# Patient Record
Sex: FEMALE | Race: WHITE | URBAN - METROPOLITAN AREA
[De-identification: names, ages, dates, MRNs, and addresses within clinical notes are randomized per-mention and may not be internally consistent; named-entity substitution may affect disease eponyms.]

---

## 2024-07-25 ENCOUNTER — OFFICE VISIT (OUTPATIENT)
Dept: OBGYN CLINIC | Facility: CLINIC | Age: 34
End: 2024-07-25
Payer: COMMERCIAL

## 2024-07-25 VITALS
DIASTOLIC BLOOD PRESSURE: 68 MMHG | HEIGHT: 64 IN | BODY MASS INDEX: 32.5 KG/M2 | WEIGHT: 190.38 LBS | SYSTOLIC BLOOD PRESSURE: 122 MMHG | HEART RATE: 96 BPM

## 2024-07-25 DIAGNOSIS — Z12.4 CERVICAL CANCER SCREENING: ICD-10-CM

## 2024-07-25 DIAGNOSIS — Z01.419 WELL WOMAN EXAM WITH ROUTINE GYNECOLOGICAL EXAM: Primary | ICD-10-CM

## 2024-07-25 PROCEDURE — 99385 PREV VISIT NEW AGE 18-39: CPT | Performed by: NURSE PRACTITIONER

## 2024-07-25 PROCEDURE — 88175 CYTOPATH C/V AUTO FLUID REDO: CPT | Performed by: NURSE PRACTITIONER

## 2024-07-25 PROCEDURE — 3074F SYST BP LT 130 MM HG: CPT | Performed by: NURSE PRACTITIONER

## 2024-07-25 PROCEDURE — 3008F BODY MASS INDEX DOCD: CPT | Performed by: NURSE PRACTITIONER

## 2024-07-25 PROCEDURE — 3078F DIAST BP <80 MM HG: CPT | Performed by: NURSE PRACTITIONER

## 2024-07-25 PROCEDURE — 87624 HPV HI-RISK TYP POOLED RSLT: CPT | Performed by: NURSE PRACTITIONER

## 2024-07-25 NOTE — PROGRESS NOTES
Here for new gynecology visit.  34 year old G 2 P 2.  Patient's last menstrual period was 2024 (approximate)..     Here for Annual Gynecologic Exam.     Menses Q 24 days. She is on oral contraceptives but planning to discontinue to do surrogacy early next month.    Last pap smear was 2022 and it was normal.  No hx of abnormal pap smears.     OB Hx:  2 .    Family gyn hx:  neg.   Family breast hx:  neg.    Screening labs some 2023  .  History reviewed. No pertinent past medical history.    History reviewed. No pertinent surgical history.    Current Outpatient Medications on File Prior to Visit   Medication Sig Dispense Refill    tretinoin 0.025 % External Cream Apply a pea sized amount to the face, nightly 45 g 0    mometasone 0.1 % External Ointment Apply to AA on finger tips and toes BID (with Occlusion) x 7 days, then decrease use to QD until symptoms resolving. 45 g 0    ISIBLOOM 0.15-30 MG-MCG Oral Tab 15 mg.      mupirocin 2 % External Ointment 0.02 Applications.      spironolactone 50 MG Oral Tab Take one tab PO 90 tablet 3    Azelaic Acid (FINACEA) 15 % External Gel Apply to face, twice daily. 50 g 3    spironolactone 100 MG Oral Tab Take 1 tablet, daily. 90 tablet 0    Cefuroxime Axetil 250 MG Oral Tab 250 mg. (Patient not taking: Reported on 2024)      cyclobenzaprine 10 MG Oral Tab 10 mg. (Patient not taking: Reported on 2024)      Doxycycline Hyclate 100 MG Oral Tab 100 mg. (Patient not taking: Reported on 2024)       No current facility-administered medications on file prior to visit.       OB History    Para Term  AB Living   2 2 2     2   SAB IAB Ectopic Multiple Live Births           2      # Outcome Date GA Lbr Kelvin/2nd Weight Sex Type Anes PTL Lv   2 Term 16 40w0d  8 lb 1 oz (3.657 kg) F Vag-Spont EPI  MATEUSZ   1 Term 14 40w0d  8 lb (3.629 kg) F Vag-Spont EPI  MATEUSZ     ROS:    General:  No wt loss, wt gain, appetite changes.    /68   Pulse 96    Ht 64\"   Wt 190 lb 6 oz (86.4 kg)   LMP 07/02/2024 (Approximate)   BMI 32.68 kg/m²     NECK:  Thyroid normal size without nodules. No adenopathy.  LUNGS:  Clear to auscultation.  COR;  Regular rate and rhythm.    BREASTS:  symmetrical in shape. No masses tenderness, secretions, or adenopathy.  ABDOMEN:  Soft and non tender.  No organomegaly.  No inguinal adenopathy.  VULVA:  No lesions or erythema.  VAGINA:  No lesions, scant old blood.  CERVIX:  No lesions or CMT.  Pap smear done with HPV.  UTERUS:  anteflexed and normal size.  ADNEXA:  No pain or masses    IMP/PLAN:    1. Well woman exam with routine gynecological exam  Regular self breast exams recommended    2. Cervical cancer screening  Thin Prep with HPV sent    See in 1 year/ prn.

## 2024-07-31 LAB
.: NORMAL
.: NORMAL
HPV E6+E7 MRNA CVX QL NAA+PROBE: NEGATIVE

## 2024-08-07 ENCOUNTER — TELEPHONE (OUTPATIENT)
Dept: OBGYN CLINIC | Facility: CLINIC | Age: 34
End: 2024-08-07

## 2024-08-07 NOTE — TELEPHONE ENCOUNTER
----- Message from Angie Paredes sent at 8/1/2024  9:18 AM CDT -----  Normal pap and HPV, routine follow up

## 2024-08-07 NOTE — TELEPHONE ENCOUNTER
Patient called back.     Contacted patient results and recommendations given.   Patient verbalized understanding.  No further questions.

## 2024-09-25 ENCOUNTER — TELEPHONE (OUTPATIENT)
Dept: OBGYN CLINIC | Facility: CLINIC | Age: 34
End: 2024-09-25

## 2024-09-25 DIAGNOSIS — N91.2 AMENORRHEA: Primary | ICD-10-CM

## 2024-09-25 NOTE — TELEPHONE ENCOUNTER
Called to follow up with patient.   Confirmed patient is serving as a surrogate, will be using estrogen and progestin through first 12 weeks per primary clinic.     Requested previous US to be faxed to our office, fax number provided via [employer]. All questions answered. Confirmed future appointment.

## 2024-09-25 NOTE — TELEPHONE ENCOUNTER
Patient calling to initiate prenatal care  LMP UNK  Patient is 7-8 weeks on 09/25 7 wks  Confirmation Ultrasound and Appointment scheduled on   Future Appointments   Date Time Provider Department Center   10/16/2024  8:00 AM [facility] [address]   10/16/2024  9:30 AM Ana Grubbs MD [facility] [address]           Any history of ectopic pregnancy? n  Any history of miscarriage? n  Any medications that you are taking on a regular basis other than prenatal vitamins? Progestin and estrogen (if not taking prenatal vitamins, encourage patient to start taking.)  Any bleeding since the first day of last LMP and your positive pregnancy test? n    Insurance BCBS    Patient is a surrogate and US was done at primary office as required by facility for surrogacy

## 2024-10-16 ENCOUNTER — PATIENT MESSAGE (OUTPATIENT)
Dept: OBGYN CLINIC | Facility: CLINIC | Age: 34
End: 2024-10-16

## 2024-10-16 ENCOUNTER — TELEPHONE (OUTPATIENT)
Dept: OBGYN CLINIC | Facility: CLINIC | Age: 34
End: 2024-10-16

## 2024-10-16 ENCOUNTER — OFFICE VISIT (OUTPATIENT)
Dept: OBGYN CLINIC | Facility: CLINIC | Age: 34
End: 2024-10-16
Payer: COMMERCIAL

## 2024-10-16 ENCOUNTER — ULTRASOUND ENCOUNTER (OUTPATIENT)
Dept: OBGYN CLINIC | Facility: CLINIC | Age: 34
End: 2024-10-16
Payer: COMMERCIAL

## 2024-10-16 VITALS
BODY MASS INDEX: 31.65 KG/M2 | WEIGHT: 185.38 LBS | HEART RATE: 94 BPM | HEIGHT: 64 IN | SYSTOLIC BLOOD PRESSURE: 112 MMHG | DIASTOLIC BLOOD PRESSURE: 82 MMHG

## 2024-10-16 DIAGNOSIS — N91.2 AMENORRHEA: ICD-10-CM

## 2024-10-16 PROBLEM — O09.819 PREGNANCY RESULTING FROM IN VITRO FERTILIZATION, ANTEPARTUM (HCC): Status: ACTIVE | Noted: 2024-10-16

## 2024-10-16 PROBLEM — Z33.3 SURROGATE PREGNANCY (HCC): Status: ACTIVE | Noted: 2024-10-16

## 2024-10-16 PROBLEM — O09.529 ANTEPARTUM MULTIGRAVIDA OF ADVANCED MATERNAL AGE (HCC): Status: ACTIVE | Noted: 2024-10-16

## 2024-10-16 PROBLEM — O99.210 OBESITY AFFECTING PREGNANCY, ANTEPARTUM (HCC): Status: ACTIVE | Noted: 2024-10-16

## 2024-10-16 PROBLEM — O30.049 DICHORIONIC DIAMNIOTIC TWIN PREGNANCY, ANTEPARTUM (HCC): Status: ACTIVE | Noted: 2024-10-16

## 2024-10-16 PROBLEM — O21.9 NAUSEA AND VOMITING IN PREGNANCY (HCC): Status: ACTIVE | Noted: 2024-10-16

## 2024-10-16 PROCEDURE — 76856 US EXAM PELVIC COMPLETE: CPT | Performed by: OBSTETRICS & GYNECOLOGY

## 2024-10-16 PROCEDURE — 3079F DIAST BP 80-89 MM HG: CPT | Performed by: OBSTETRICS & GYNECOLOGY

## 2024-10-16 PROCEDURE — 99204 OFFICE O/P NEW MOD 45 MIN: CPT | Performed by: OBSTETRICS & GYNECOLOGY

## 2024-10-16 PROCEDURE — 3008F BODY MASS INDEX DOCD: CPT | Performed by: OBSTETRICS & GYNECOLOGY

## 2024-10-16 PROCEDURE — 3074F SYST BP LT 130 MM HG: CPT | Performed by: OBSTETRICS & GYNECOLOGY

## 2024-10-16 RX ORDER — PROGESTERONE 50 MG/ML
INJECTION, SOLUTION INTRAMUSCULAR
COMMUNITY
Start: 2024-09-09

## 2024-10-16 RX ORDER — ESTRADIOL 2 MG/1
2 TABLET ORAL DAILY
COMMUNITY

## 2024-10-16 NOTE — PATIENT INSTRUCTIONS
The ADA and ACOG define patients at increased risk of type 2 diabetes based on: body mass index (BMI) >=25 kg/m2 (>=23 kg/m2 in  Americans) plus one or more of the following [2,28]:    GDM in a previous pregnancy.    Glycated hemoglobin >=5.7 percent (39 mmol/mol), impaired glucose tolerance, or impaired fasting glucose on previous testing.    First-degree relative with diabetes.    High-risk race/ethnicity (eg, , , ,  American, ).    History of cardiovascular disease.    Hypertension (>=140/90 mmHg) or on therapy for hypertension.    High-density lipoprotein cholesterol level <35 mg/dL (0.90 mmol/L) and/or a triglyceride level >250 mg/dL (2.82 mmol/L).    Polycystic ovary syndrome (PCOS).    Physical inactivity.    Other clinical condition associated with insulin resistance (eg, severe obesity, acanthosis nigricans).          Candidates    Low-dose aspirin 81 mg: Take 2 tablets by mouth daily starting at 12 weeks until 36 weeks      Selecting high risk women for prophylaxis -- The greatest benefit appears to be in women at moderate to high risk of developing the disease [10,13-20], in whom a 62 percent reduction of  preeclampsia occurred in the ASPRE trial [21]. We recommend low-dose aspirin prophylaxis for women at high risk for preeclampsia. There is no consensus on the exact criteria that confer high risk. It is reasonable to use the USPSTF high-risk criteria, which are also endorsed by the American College of Obstetricians and Gynecologists (ACOG) [22,23]. The incidence of preeclampsia is estimated to be at least 8 percent for pregnant women with any one of these high risk factors:    ?Previous pregnancy with preeclampsia, especially early onset and with an adverse outcome.    ?Type 1 or 2 diabetes mellitus.    ?Chronic hypertension.    ?Multifetal gestation.    ?Kidney disease.    ?Autoimmune disease with potential vascular complications  (antiphospholipid syndrome, systemic lupus erythematosus).      We generally follow the USPSTF criteria and recommend low-dose aspirin for preeclampsia prevention to patients with two or more of the following moderate risk factors [22]:    ?Nulliparity.    ?Obesity (body mass index >30 kg/m2).    ?Family history of preeclampsia in mother or sister.    ?Age >=35 years.    ?Sociodemographic characteristics (Black persons, lower income level [recognizing that these are not biological factors]).    ?Personal risk factors (eg, previous pregnancy with low birth weight or small for gestational age infant, previous adverse pregnancy outcome [eg, stillbirth], interval >10 years between pregnancies).           [facility]- Prenatal Testing    The following information is designed to help you understand some of the optional tests that are available to you to screen for problems in your pregnancy.  Before considering any of these tests, you will need to consider the following questions:    [1] What would you do if an abnormality were detected?  If the answer is nothing, then you may decide to decline all testing.  [2] Would you be willing to undergo testing which might increase your risk of miscarriage, such as an amniocentesis or CVS (see below)?  [3] If you have the initial testing, and it indicates that there might be a problem, and you subsequently decide not to do invasive testing such as an amniocentesis, would you worry excessively through the remainder of the pregnancy?    The following tests are available to screen for problems in your pregnancy:      [1]  First Trimester Screening (also called Nuchal Thickness, Nuchal Translucency or NT)- This is an abdominal ultrasound done between 10 and 14 weeks by a perinatology specialist (Maternal Fetal Medicine, MFM) which measures the thickness of the skin behind your baby's neck.  Increased thickness of the skin in this area has been linked to an increased risk of  genetic abnormalities like Down Syndrome.  A second visit may be required if the baby is not in the correct position.  The ultrasound results are combined with a finger stick blood test to increase the sensitivity of the test.  You will need to schedule an appointment with the Maternal Fetal Medicine office.  Address and phone number below:  Please verify insurance coverage:  CPT code: 06772 (gamboa) or 41263 (twins)  Diagnosis: AMA (advanced maternal age) - O09.529  Maternal Fetal Medicine  Dr. Rondon, Dr. Acosta, Dr. Ibarra, and Dr. Vanegas  [address] [facility] [facility] [address] [place] [id number]  Phone [phone]  Fax [phone]      [2] Cell-Free DNA testing (NIPT)- This is a blood test done any time after 10-11 weeks which screens for genetic abnormalities like Down Syndrome.  It is greater than 98% sensitive but requires an amniocentesis for confirmation if abnormal.  It can also tell you the sex of the baby.  CPT code: 61200  Diagnosis code: AMA (advanced maternal age) - O09.529  Testing options:   Peggy- preferred for [facility] patients or all patients.  Please call [phone] or go to [employer]/empower to review billing, prior authorizations or referrals  [id number]- Optional for all insurance plans except [facility].  Please call [employer] at [phone] or go to [email or web address]/ to review billing, prior authorizations, or referrals        [3]  Alpha Fetoprotein (AFP, MSAFP)- This is a simple blood test that screens for neural tube defects such as spina bifida (an abnormal opening in the spine).  It is usually performed around 16-20 weeks.  Additional testing such as a Level II ultrasound may be recommended for an abnormal test result.  Please verify insurance coverage:  CPT code: 30459 Diagnosis code: Genetic Screening- Z36.8A    ---------------------------------------------------------------------------------------------------------------    Carrier screening:     [5] Cystic  Fibrosis/SMA Carrier Screening- Cystic Fibrosis is a genetic disease which causes lung problems in the infant.  SMA (spinal muscular atrophy) is a genetic disease that affects the nerve cells of the spinal cord.  These genetic defects would need to be passed from both parents to the child.  A blood test is performed on the mother, and if her test is positive, then the father should also be tested. These tests can be done at any time in the pregnancy, usually in the first trimester with your initial OB labs.  All babies are screened for cystic fibrosis after birth.  Please verify insurance coverage:  Cystic Fibrosis CPT Code: 09703 Diagnosis code: Cystic Fibrosis screening- Z13.228  SMA CPT Code: 57492 Diagnosis code: Genetic Screening- Z36.8A or Testing for Genetic Disease Carrier- Z13.71    [6] Hemoglobinopathy carrier screening: The hemoglobinopathies are heterogeneous genetic disorders of hemoglobin (Hb) typically inherited in an autosomal recessive pattern. The clinical presentation ranges from asymptomatic in carriers to mild to severe disease in homozygotes and compound heterozygotes. At the severe end of the spectrum, hemoglobinopathies impact quality of life, require life-long care (typically with regular blood transfusions), and can shorten life expectancy. In the [place], the American College of Obstetricians and Gynecologists (ACOG) recommends carrier screening and counseling for genetic conditions, ideally before pregnancy. Firstline for hemoglobinopathy carrier screening, includes a CBC evaluating red cell indices in all pregnant individuals [17]. A hemoglobin electrophoresis (or other protein chemistry method) is performed in addition to a CBC if there is suspicion of hemoglobinopathy based on ethnicity (, Mediterranean, , Southeast , or [place] descent) or if red cell indices show a low mean MCV or MCH. Characteristics other than ethnicity that are associated with a  higher risk for an individual to be a hemoglobinopathy carrier include a history of chronic anemia or stillbirth, a relative with a hemoglobin structural variant or thalassemia, and consanguinity In 2022 ACOG updated its recommendations to offer universal hemoglobinopathy testing to people planning pregnancy or at the initial prenatal visit if no prior testing results are available for interpretation. This is based on a high frequency of a hemoglobinopathy trait, in particular S trait, in the [place] and noting that race and self-identified ethnicity are poor alternatives for genetics.  Hemoglobin electrophoresis: Send out lab to [facility].  CPT Code: 99300 or 26468 or 55764 Diagnosis code: Z13.0 Encounter screening for hematological disorder    [employer] Carrier Screening: A carrier screening panel is available that includes cystic fibrosis, spinal muscular atrophy, hemoglobinopathy and additional autosomal recessive or X-linked disorders.  A full review of the carrier screening panel was available via [employer] website. Please call [phone] or go to [employer]/empower to review billing, prior authorizations or referrals.       ---------------------------------------------------------------------------------------------------------------    [6]  Level II Ultrasound-  This is an abdominal ultrasound done at approximately 20-21 weeks by a perinatology specialist (TENA) at [facility] which looks at many of the baby's internal structures.  Abnormalities in certain structures have been associated with an increased risk of genetic abnormalities.  It also screens for NTD's.  This ultrasound would replace the Level I Ultrasound that we normally perform at our office around the same time.    [7]  Amniocentesis-  During this procedure, a needle is passed through your abdominal wall to withdrawal some amniotic fluid from around the baby.  It screens for both genetic abnormalities and NTD's and is usually  performed around 15-16 weeks.  This test has the highest accuracy for detecting genetic abnormalities, but there may be a small risk of miscarriage or other complications.  A similar procedure called Chorionic Villus Sampling (CVS) is performed by passing a catheter through the vagina to remove a small sample of tissue from the placenta (afterbirth).  CVS may have a higher risk of miscarriage and other rare complications compared to amniocentesis but can be performed earlier in pregnancy.  Amniocentesis is often recommended/offered if any of the previously mentioned tests come back abnormal.  A pamphlet is available if you would like more information about this option.  If you decide to have an amniocentesis, the other tests would be unnecessary.      The above information covers some of the basics.  Pamphlets on the Cell-Free DNA test, Quad Screen and Cystic Fibrosis test should be in your prenatal packet.  Your doctor will discuss this information in more detail, and feel free to ask additional questions.  Also, remember that all of these tests are optional, and will only be performed at your request.  Testing that needs to be done through the perinatologist's office may require 2-3 weeks lead time to schedule.            Foods to Avoid During Pregnancy  Deli Meats- You may eat deli meats from the [employer].  If you eat deli meats in the package, it may be contaminated with Listeria. Heat all deli meats in a package to 165 degrees Fahrenheit before eating, even if the label states the meat is “pre-cooked”.    Soft Cheeses and Unpasteurized Products - You may eat soft cheeses that are pasteurized.  Please avoid all soft cheeses, milk or juice that is unpasteurized.  Fish- avoid fish that contains a high level of mercury. These include but are not limited to; [place], Orange Roughy, Tile Fish, Shark, Swordfish, and Roel Mackerel. Please see chart below for good fish choices in pregnancy. Also avoid any raw, uncooked  shellfish such as oysters, clams, or sushi.    Make sure to cook all meats; including ground beef, pork, and poultry to their desired temperatures before consuming. This reduces the chance of a food borne illness.  Caffeine- limit to 200 mg or an 8oz cup daily or less.   Alcohol- no amount of alcohol is determined to be safe in pregnancy. Do not drink any alcoholic beverages while pregnant.        Medications Safe in Pregnancy  The following over-the-counter medications may be taken safely after 12 weeks gestation by any patient who is pregnant.  Please follow the instructions on the package for adult dosage.  If you experience any symptoms prior to 12 weeks, please call the office at [phone].      Colds/Congestion: Flonase, Saline Nasal Spray, Neti Pot, Plain Robitussin, Robitussin DM, Mucinex DM, Vicks 44 E, Vicks Vapor rub, Cough drops without Zinc or Sudafed.   Contact your doctor if you have a persistent fever over 100.4, shortness of breath, coughing up green mucus, or a sore throat that persists from more than 3 days    Diarrhea: Imodium, Maalox Anti-Diarrheal or Kaopectate  Contact the office if you have diarrhea for more than 3 days.    Allergies: Benadryl, Claritin or Zyrtec    Hemorrhoids: Tucks pads, Preparation H, Annusol, Sitz bath or Witch hazel  Eat a high fiber diet and drink plenty of fluids.    Yeast: Monistat 3 or 7  Call if your symptoms do not improve, or if you experience vaginal bleeding, or a watery discharge.    Constipation: Metamucil, Colace, fiber supplement, Milk of Magnesia or Dulcolax  Drink plenty of fluids, eat high-fiber foods and take the above laxatives sporadically.     Headache or Mild aches and pain: Extra Strength Tylenol     Gas: Gas X, Gelusil, Papaya Tablets, Maalox, Mylicon or Mylanta Gas    Heartburn: Tums, Mylanta, Pepcid AC or Maalox    Sore throat: Alcohol free Chloraseptic spray or Lozenges     Nausea and Vomiting: ½ tablet Unisom plus 100mg of Vitamin B6 at  bedtime (may increase B6 up to 200mg per day), Cara root tea or raspberry leaf tea    Insomnia: Tylenol PM, Vitamin B6 50mg, warm bath or milk, Unisom, Nytol or Sominex.     We have listed a few medications for common symptoms seen in pregnancy.  Please contact the office if you are unsure about any over the counter medications that are not listed above.

## 2024-10-16 NOTE — TELEPHONE ENCOUNTER
[facility] forms rec'd, [facility] [employer]. [place] forms forward to the Forms dept via email and inter office envelope. Forms to be sent to [facility] [employer] (address, fax, email on form) and pt would like a copy to be faxed to BRIAN Ta fax# [phone] and a copy uploaded to pt's My Chart.  RANI signed and $25 fee paid

## 2024-10-16 NOTE — TELEPHONE ENCOUNTER
Contacted patient.  Patient's appointment reviewed and discussed with patient.  Patient stated that she was upset when she left the appointment because she felt that her appointment was focused on risks associated with her pregnancy and not individualized to the patient herself.  The patient stated that she has had prior pregnancies before and is aware pregnancy.  However, I reminded the patient that her prior pregnancies were gamboa and were considered not high risk.  Therefore, I took the opportunity to provide further information and counseling regarding high risk pregnancy issues directly related to her current pregnancy.     I apologized to the patient that she felt unheard.  I reviewed with the patient that we discussed her concerns at the end of the visit.  However, prior to her concerns, I did provide extensive information regarding her pregnancy risks as her pregnancy is considered higher risk.  I also reminded the patient that I inquired about further questions or concerns at the end of my high risk pregnancy discussion to allow for the patient to express her individualized concern or questions. The patient did take the opportunity to discuss her symptoms of nausea, vomiting and fatigue and how it has interfered with her job.  The patient expressed how she can have severe symptoms that prevent her from being able to work.  The patient was concerned that this information was not provided in the progress note.  I discussed with the patient that it is difficult for any provider to dictate the entire visit in a progress note but I did try to summarize the patient's symptoms and concerns.  I provided information for the patient's request for FMLA.  I discussed with the patient that the person filling out the FMLA is not medical and will not make the decision of whether or not FMLA could be completed.  A FMLA request is based on the physician recommendation and I stated to the patient that she has been given  an approval for the [place] request by this physician.  However, I reminded the patient again that her employer may elect to decline the request despite providing patient's reported symptoms and concerns.    I discussed with the patient effective ways to communicate in future visits.  I admitted to the patient that we might not have been able to communicate effectively today and I apologized again to the patient.  I inquired if the patient had any further questions, concerns or issues relating to herself and of her pregnancy.  Patient declined.  She stated she appreciated the opportunity to speak again in the follow-up call. I encouraged her to write down questions or concerns that might come up and to bring a list of questions or concerns that she may have to her visits so that we can ensure that her care is individualized her preference.  At this time, the patient stated that she was satisfied with the follow-up.  All questions and concerns were addressed.    Ana Grubbs MD   [facility]

## 2024-10-16 NOTE — PROGRESS NOTES
Medical records received after confirmation of pregnancy visit.  Medical records reviewed.    OB ultrasound 9/23/2024    Twin gestation    Fetus A:  Located to the maternal left.  Yolk sac present.  Crown-rump length 0.84 cm, gestational age 6 weeks 6 days, KARRI 5/13/2025.  Fetal heart rate 121 bpm.    Fetus B:   Located to maternal right.  Yolk sac present.  Crown-rump length 0.80 cm, gestational age 6 weeks 5 days, KARRI 5/14/2025.    OB ultrasound 724    Twin gestation    Findings: There are living dichorionic and diamniotic twin pregnancy appreciated.  Gestational sac A: yolk sac present, crown-rump length 2.35 cm, gestational age 9 weeks 0 days.  Fetal heart rate 175 bpm  Gestational sac B: Yolk sac present, crown-rump length 2.38 cm, gestational age 9 weeks 1 day, fetal heart rate 170 bpm.  No subchorionic hemorrhage noted.  Ovaries unremarkable.

## 2024-10-16 NOTE — PROGRESS NOTES
[facility]  Obstetrics and Gynecology    Subjective:     Krista Collado is a 34 year old  female presents with c/o secondary amenorrhea and positive pregnancy test. The patient was recommended to return for further evaluation. The patient reports doing ok. No LMP recorded (lmp unknown). Patient is pregnant.. Possible KARRI 25. Tested embryo with parents genetic material and IVF pregnancy with 2 embryo transfer. Parents live in [place]. Patient unaware of medical conditions of parents. Reports nausea episodes. She is able to tolerate PO but decreased. Has not been to ED or urgent care. Reports working 14 hours a day. Reports she has needed to take time off from work due to severity of symptoms.     Pregnancy planned?: yes  Pregnancy desired? Yes - Surrogate pregnancy     Review of Systems:  General: no complaints per category. See HPI for additional information.   Breast: no complaints per category. See HPI for additional information.   Respiratory: no complaints per category. See HPI for additional information.   Cardiovascular: no complaints per category. See HPI for additional information.   GI: no complaints per category. See HPI for additional information.   : no complaints per category. See HPI for additional information.   Heme: no complaints per category. See HPI for additional information.     OB History:   OB History    Para Term  AB Living   3 2 2     2   SAB IAB Ectopic Multiple Live Births           2      # Outcome Date GA Lbr Kelvin/2nd Weight Sex Type Anes PTL Lv   3 Current            2 Term 16 40w0d  8 lb 1 oz (3.657 kg) F Vag-Spont EPI  MATEUSZ   1 Term 14 40w0d  8 lb (3.629 kg) F Vag-Spont EPI  MATEUSZ       Gyne History:  Menarche: 11  Period Cycle (Days): pregnant  Period Duration (Days): n/a  Period Flow: n/a  Use of Birth Control (if yes, specify type): None  Hx Prior Abnormal Pap: No  Pap Date: 24  Pap Result Notes: wnl  No LMP recorded (lmp unknown).  Patient is pregnant.      Meds:  Medications Ordered Prior to Encounter[1]    All:  Allergies[2]    PMH:  History reviewed. No pertinent past medical history.    PSH:  History reviewed. No pertinent surgical history.    Objective:     Vitals:    10/16/24 0922   BP: 112/82   Pulse: 94   Weight: 185 lb 6.4 oz (84.1 kg)   Height: 64\"         Body mass index is 31.82 kg/m².    General: AAO.NAD.   CVS exam: normal peripheral perfusion  Chest: non-labored breathing, no tachypnea   Abdominal exam: deferred   Pelvic exam: deferred      Imaging:  LMP DOC KARRI GA(KARRI)   A GA(AUA) 10w3d              B GA(AUA) 10w2d   A KARRI(AUA) 2025     B KARRI(AUA) 2025       2D Measurements AUA Value m1 m2 m3 Meth. GP GA   A CRL (Hadlock) 3.57 cm 3.63 3.50 avg. 10w3d   A YS 0.31 cm 0.31 avg.   B CRL (Hadlock) 3.41 cm 3.36 3.46 avg. 10w2d   B YS 0.21 cm 0.21 avg.     Fetal Heart Rate   Fetus: A 161 bpm   Fetus: B 147 bpm   Fetus Compare A B   LMP: DOC: GA(KARRI) KARRI   AUA 10w3d 10w2d     Comment   di di twins   2 gest sac   2 yolk sac   2 fetal pole   cardiac activity seen   both adnexa appear wnl   cvx appears wnl   no free fluid   trns abd       KARRI 25 by US - will need to verify with [facility] medical records     Reviewed and electronically signed by: Ana Grubbs MD, 10/16/24, 9:43 AM          Assessment:     Krista Collado is a 34 year old  female who presents for secondary amenorrhea and positive pregnancy test          Plan:     Patient Active Problem List    Diagnosis    Surrogate pregnancy (HCC)     IVF pregnancy with frozen embryo x 2. Parents live in [place].   [ ] medical records       Pregnancy resulting from in vitro fertilization, antepartum (HCC)     [ ] MFM      Antepartum multigravida of advanced maternal age (HCC)     Low dose ASA  [ ] MFM      Obesity affecting pregnancy, antepartum (HCC)     Low dose ASA      Dichorionic diamniotic twin pregnancy, antepartum (HCC)     [ ] MFM       Nausea and vomiting in  pregnancy (HCC)           Secondary amenorrhea  - a/w positive pregnancy test  - Ultrasound reviewed and discussed with patient, refer above  - Pt counseled on safety, diet, exercise, caffiene, tobacco, ETOH, sexual activity, ER precautions, diet, exercise, appropriate otc medication use, substance abuse   - Counseled on taking a PNV with 0.4mg of folic acid   - genetic screening testing d/w patient and family, pt declines invasive diagnostic testing and considering first versus second trimester screening   -Carrier screening, neural tube defect screening and hemoglobinopathy screening reviewed and discussed with patient; information provided and patient considering options and recommended to request desired screening at follow-up visit  - advised to follow up to establish prenatal care   - SAB precautions provided   - d/w nausea and vomiting in pregnancy including vitamin B 6 and unisom   - COVID 19 precautions provided, recommend vaccination and booster if/when applicable   -Travel precautions provided, patient advised to not travel beyond 36 weeks as long as the pregnancy remains low risk.  Advised not to travel beyond 28 weeks for high risk pregnancy. Recommend compression socks, increase water intake and movement during traveling to prevent blood clots.  Request for FMLA   -Patient requesting intermittent FMLA as she has had to take time off of work due to nausea and fatigue of pregnancy  -Patient requesting the ability to take off 1 day/week of work if needed for nausea and fatigue of pregnancy  -Patient submitted FMLA paperwork  -Discussed with patient that she may request intermittent FMLA for her nausea and fatigue of pregnancy.  However, discussed with patient that her employer might decline per request.  Discussed with patient that activation of FMLA might interfere with her postpartum FMLA but patient states that she has already discussed with her employer and intermittent FMLA during her pregnancy will  not interfere with the postpartum FMLA.  -Recommend FMLA for up to 14 weeks for nausea and fatigue of pregnancy      All of the findings and plan were discussed with the patient.  She notes understanding and agrees with the plan of care.  All questions were answered to the best of my ability at this time.      Total patient time was 50 minutes in evaluation, consultation, and coordination of care. This included face to face and non-face to face actions. The patient's questions and concerns that were addressed.     RTC in 4 weeks for NOB visit or sooner if needed     Ana Grubbs MD   [facility] - OBGYN     Discussed with patient that there will not be further notification of normal or benign results other than receiving results on Confovis. A Confovis message or telephone call will be placed by the physician and/or office staff if results are abnormal.         Note to patient and family   The 21st Century Cures Act makes medical notes available to patients in the interest of transparency.  However, please be advised that this is a medical document.  It is intended as [id number] communication.  It is written and medical language may contain abbreviations or verbiage that are technical and unfamiliar.  It may appear blunt or direct.  Medical documents are intended to carry relevant information, facts as evident, and the clinical opinion of the practitioner.      This note could include assistance by Dragon voice recognition. Errors in content may be related to improper recognition by the system; efforts to review and correct have been done but errors may still exist.              [1]   Current Outpatient Medications on File Prior to Visit   Medication Sig Dispense Refill    estradiol 2 MG Oral Tab Take 1 tablet (2 mg total) by mouth daily.      progesterone oil 50 MG/ML Intramuscular Oil INJECT 2ML INTO THE MUSCLE EVERY DAY      tretinoin 0.025 % External Cream Apply a pea sized amount to the face, nightly (Patient not  taking: Reported on 10/16/2024) 45 g 0    mometasone 0.1 % External Ointment Apply to AA on finger tips and toes BID (with Occlusion) x 7 days, then decrease use to QD until symptoms resolving. (Patient not taking: Reported on 10/16/2024) 45 g 0    Cefuroxime Axetil 250 MG Oral Tab 250 mg. (Patient not taking: Reported on 10/16/2024)      cyclobenzaprine 10 MG Oral Tab 10 mg. (Patient not taking: Reported on 10/16/2024)      ISIBLOOM 0.15-30 MG-MCG Oral Tab 15 mg. (Patient not taking: Reported on 10/16/2024)      Doxycycline Hyclate 100 MG Oral Tab 100 mg. (Patient not taking: Reported on 10/16/2024)      mupirocin 2 % External Ointment 0.02 Applications. (Patient not taking: Reported on 10/16/2024)       No current facility-administered medications on file prior to visit.   [2] No Known Allergies

## 2024-10-18 ENCOUNTER — MED REC SCAN ONLY (OUTPATIENT)
Dept: OBGYN CLINIC | Facility: CLINIC | Age: 34
End: 2024-10-18

## 2024-10-22 NOTE — TELEPHONE ENCOUNTER
Received Family Medical Leave Act form in forms department. Valid [facility] authorization attached. Logged for processing.

## 2024-10-23 ENCOUNTER — TELEPHONE (OUTPATIENT)
Dept: OBGYN CLINIC | Facility: CLINIC | Age: 34
End: 2024-10-23

## 2024-10-23 DIAGNOSIS — O30.049 DICHORIONIC DIAMNIOTIC TWIN PREGNANCY, ANTEPARTUM (HCC): ICD-10-CM

## 2024-10-23 DIAGNOSIS — Z33.3 SURROGATE PREGNANCY (HCC): Primary | ICD-10-CM

## 2024-10-23 DIAGNOSIS — O09.819 PREGNANCY RESULTING FROM IN VITRO FERTILIZATION, ANTEPARTUM (HCC): ICD-10-CM

## 2024-10-23 NOTE — TELEPHONE ENCOUNTER
Dr. Grubbs    **The ACKNOWLEDGE button has been moved to the top right ribbon**    Please sign off on form if you agree to: Family Medical Leave Act intermittent for nausea and fatigue  1 episode per 1 week lasting 1 day (per ovn)  (place your signature on the first page only)  -From your Inbasket, Highlight the patient and click Chart  -Double click the 10/16/24 Forms Completion telephone encounter  -Scroll down to the Media section  -Click the blue Hyperlink: Zane Grubbs 10/23/24    -Click Acknowledge located in the top right ribbon/menu  -Drag the mouse into the blank space of the document and a + sign will appear. Left click to  electronically sign the document.    Thank you,    Katlin

## 2024-10-23 NOTE — TELEPHONE ENCOUNTER
Left voice mail to confirm that both NIPT and NT are tests that can be ordered if desired.   Explained NT would be a referral to [facility].

## 2024-10-23 NOTE — TELEPHONE ENCOUNTER
Patient is a surrogate and was seen for first OB appt on 10/16.  The patient is asking if we draw NIPT blood work and perform a Nuchel translucent US during pregnancy.  Pleaes advise the patient.  Thank you.

## 2024-10-24 NOTE — TELEPHONE ENCOUNTER
Unfortunately, that is correct. The test can only identify two separate genetic material. Surrogate pregnancy has three separate genetic material. I apologize for the confusion but I was unaware of the test limitations regarding surrogacy. However, she can still proceed with first trimester screen with nuchal translucency with MFM.

## 2024-10-24 NOTE — TELEPHONE ENCOUNTER
Patient is ineligible for Panorama due to surrogate status of twin pregnancy.  Routed to provider for recommendation on genetic testing.

## 2024-10-25 NOTE — TELEPHONE ENCOUNTER
Explained to patient that NIPT is not an option for this pregnancy but NT test is okay.  Phone number provided for patient to call M to schedule appointment.  Patient aware that test is time sensitive and she should call now to schedule.

## 2024-10-28 ENCOUNTER — TELEPHONE (OUTPATIENT)
Dept: OBGYN CLINIC | Facility: CLINIC | Age: 34
End: 2024-10-28

## 2024-10-28 NOTE — TELEPHONE ENCOUNTER
ANDREWM called stating that the basic panel for panorama can be ordered for this patient.  Please call M with any questions.

## 2024-10-28 NOTE — PROGRESS NOTES
Outpatient Maternal-Fetal Medicine Consultation    Dear Dr. Jimenez    Thank you for requesting ultrasound evaluation and maternal fetal medicine consultation on your patient Krista Collado.  As you are aware she is a 34 year old female  with a twin pregnancy and an Estimated Date of Delivery: 25 A maternal-fetal medicine consultation was requested secondary to advanced maternal age and twin gestation .  Her prenatal records and labs were reviewed.    HISTORY  # 1 - Date: 14, Sex: Female, Weight: 8 lb (3.629 kg), GA: 40w0d, Type: Vaginal, Spontaneous, Apgar1: None, Apgar5: None, Living: Living, Birth Comments: None    # 2 - Date: 16, Sex: Female, Weight: 8 lb 1 oz (3.657 kg), GA: 40w0d, Type: Vaginal, Spontaneous, Apgar1: None, Apgar5: None, Living: Living, Birth Comments: None    # 3 - Date: None, Sex: None, Weight: None, GA: None, Type: None, Apgar1: None, Apgar5: None, Living: None, Birth Comments: None      Past Medical History  The patient  has no past medical history on file.    Past Surgical History  The patient  has no past surgical history on file.    Family History  The patient has no family status information on file.       Medications:   Current Outpatient Medications:     estradiol 2 MG Oral Tab, Take 1 tablet (2 mg total) by mouth daily., Disp: , Rfl:     progesterone oil 50 MG/ML Intramuscular Oil, INJECT 2ML INTO THE MUSCLE EVERY DAY, Disp: , Rfl:     tretinoin 0.025 % External Cream, Apply a pea sized amount to the face, nightly (Patient not taking: Reported on 10/16/2024), Disp: 45 g, Rfl: 0    mometasone 0.1 % External Ointment, Apply to AA on finger tips and toes BID (with Occlusion) x 7 days, then decrease use to QD until symptoms resolving. (Patient not taking: Reported on 10/16/2024), Disp: 45 g, Rfl: 0    Cefuroxime Axetil 250 MG Oral Tab, 250 mg. (Patient not taking: Reported on 10/16/2024), Disp: , Rfl:     cyclobenzaprine 10 MG Oral Tab, 10 mg. (Patient not taking:  Reported on 10/16/2024), Disp: , Rfl:     ISIBLOOM 0.15-30 MG-MCG Oral Tab, 15 mg. (Patient not taking: Reported on 10/16/2024), Disp: , Rfl:     Doxycycline Hyclate 100 MG Oral Tab, 100 mg. (Patient not taking: Reported on 10/16/2024), Disp: , Rfl:     mupirocin 2 % External Ointment, 0.02 Applications. (Patient not taking: Reported on 10/16/2024), Disp: , Rfl:   Allergies: Allergies[1]    PHYSICAL EXAMINATION:  LMP  (LMP Unknown)   General: alert and oriented,no acute distress  Abdomen: gravid, soft, non-tender  Extremities: non-tender, no edema    OBSTETRIC ULTRASOUND    This is Dichorionic-diamniotic intrauterine pregnancy.  Twin A:   Cardiac activity 153 bpm  Amniotic fluid is normal amount.  PLacenta is anterior  The CRL is consistent with gestational age. Nasal bone seen. The nuchal translucency measures 1.32 mm. This is within normal limits.      Twin B:  Cardiac activity is 161 bpm  Amniotic fluid is normal amount  Placenta is anterior  The CRL is consistent with gestational age. Nasal bone seen. The nuchal translucency measures 1.14 mm. This is within normal limits.      The maternal uterus and ovaries appear normal.    See PACS/Imaging Tab For Complete Ultrasound Report  I interpreted the results and reviewed them with the patient.    DISCUSSION  During her visit we discussed and reviewed the following issues:  ADVANCED MATERNAL AGE    Background  I reviewed with the patient that pregnancies in women of advanced maternal age (35 or older at delivery) are associated with elevated risks.  Specifically, there is a higher rate of:    Fetal malformations  Preeclampsia  Gestational diabetes  Intrauterine fetal death    As a result, enhanced pregnancy surveillance is advised for these patients including a comprehensive ultrasound to assess for fetal malformations  (at 20 weeks) and a third trimester ultrasound assessment for fetal growth (at 32 weeks).  In addition, weekly NST's (initiating at 36 weeks gestation  for women 35-39 years and at 32 weeks gestation for women 40 years and older) are also advised.  Routine obstetric care is more than adequate to assess for gestational diabetes and preeclampsia; hence, no further significant alterations in obstetric care are advised.    Fetal Aneuploidy    We also discussed the increased risk of chromosomal abnormalities associated with advanced maternal age.  I reviewed that an ultrasound examination cannot reliably exclude potential genetic abnormalities.     Non-invasive Pregnancy Testing (NIPT)    I reviewed current non-invasive screening options.  Currently non-invasive pregnancy testing (NIPT) offers the highest detection rate (with the lowest false positive rate) for the detection of fetal aneuploidy amongst high-risk patients.  The limitations of detailed mid-trimester sonography was reviewed with the patient.  First trimester screening and second trimester multiple-marker serum serum screening as alternative aneuploidy screening options were also reviewed.  However, both of these tests are associated with lower detection and higher false positive rates.    The patient ultimately elected to obtain NIPT (cell-free fetal DNA assessment).  We expect the results to be available within 7 days; the patient will be informed when the results are available.    DIAMNIOTIC DICHORIONIC TWIN GESTATION    Background:  Twins occur in approximately 1 in 80 in the [place].   Multiple pregnancies comprise an increasing proportion of the total pregnancies in the developed world due to older maternal age at childbirth and the expanded use of fertility treatments. In countries with high rates of multiple births, 30 to 50 percent of twin pregnancies occur after infertility treatment.        Increased  Mortality of Twin Gestations    The mortality rate of infants is higher in multiple than gamboa pregnancies because of the increased rates of prematurity, growth abnormalities,  other obstetric complications, and congenital anomalies.  The incidence of single fetal death in twin pregnancies is 2.5 to 5.0 percent. In a retrospective study of 92 pregnancies, the incidence of single fetal death was much higher in monochorionic than diamniotic twins/triplets (26 versus 2.4 percent).     Birth with  Morbidity  The most serious risk of multiple gestations is spontaneous  delivery, which is associated with increased  mortality and short-term and long-term morbidity.  50% of twins are born  with average gestational age of delivery being 36 weeks.  The complications seen most frequently are hypothermia, respiratory abnormalities, patent ductus arteriosus, intracranial hemorrhage, hypoglycemia, necrotizing enterocolitis, infection, and retinopathy of prematurity.      IUGR  Growth restriction and discordant growth are frequent complications of multiple pregnancies and contribute to  morbidity and mortality.  IUGR is a major risk factor for increased  morbidity and mortality in twin pregnancies.       Congenital Anomalies  Higher rates of congenital anomalies contribute to adverse outcomes in multiple births. Monozygotic twins are two to three times more likely to have structural defects than dizygotic twins or singletons.    Psychosocial Stress Of Twin Gestations  First-time parents rarely appreciate the intensity of care that infants require. The emotional stress and complexity of caring for gamboa newborns is magnified with multiples.  The risk of postpartum depression is increased.   Encourage assistance from additional caregivers such as other family members if available. Organizations of parents of multiples may provide helpful coping strategies.    IVF GESTATION  Conception by IVF is associated with an increased incidence of several obstetrical and  complications. Most of these are related to the high incidence of multiple  gestations. The precise reasons for this increase in adverse outcomes are not clear.    ART is associated with an up to two-fold increased risk of  birth and low birth weight in mena pregnancies. ART does not appear to be an independent risk factor for adverse neurodevelopment outcome. ART appears to be at increased risk of delivering offspring with congenital malformations compared with fertile women who conceive naturally. Heart defects have been reported as high at 6 % so fetal echocardiography is recommended in all IVF patients. Stillbirth and  mortality rates appear to be increased as much as four-fold.    Mena ART pregnancies, the relative risk of common pregnancy complications such as fetal growth restriction, preeclampsia, prematurity and  mortality are increased.    IMPRESSION:  IUP at 12w3d  Dichorionic Diamniotic Twin Gestation  AMA: obtained cell free fetal DNA  today, declined invasive testing -however oocyte from 23-year-old and patient reports low risk PGT  IVF Gestation  Gestational Carrier    RECOMMENDATIONS:  Continue care with Dr. Jimenez  Cervical length U/S at 16 weeks  Level II U/S at 20 weeks  Fetal echocardiogram at 24 weeks  Monthly growth ultrasounds in the third trimester  Weekly NST's at 32 weeks.  Monitor for signs or symptoms of  labor, preeclampsia or fetal growth disturbances.  In the absence of other maternal or fetal indications, delivery advised at 38 weeks gestation.    Thank you for allowing me to participate in the care of your patient.  Please do not hesitate to contact me if additional questions or concerns arise.      Malcom Acosta M.D.    40 minutes spent in review of records, patient consultation, documentation and coordination of care.  The relevant clinical matter(s) are summarized above.     Note to patient and family  The 21st Century Cures Act makes medical notes available to patients in the interest of transparency.   However, please be advised that this is a medical document.  It is intended as [id number]-[id number] communication.  It is written and medical language may contain abbreviations or verbiage that are technical and unfamiliar.  It may appear blunt or direct.  Medical documents are intended to carry relevant information, facts as evident, and the clinical opinion of the practitioner.         [1] No Known Allergies

## 2024-10-28 NOTE — TELEPHONE ENCOUNTER
Received additional records from [facility]. Placed in abstract bin in [facility]    Future Appointments   Date Time Provider Department Center   10/31/2024 10:00 AM  [id number]  [facility]   11/7/2024  9:00 AM Angie Paredes APN [facility] [address]

## 2024-10-31 ENCOUNTER — LAB ENCOUNTER (OUTPATIENT)
Dept: LAB | Age: 34
End: 2024-10-31
Attending: STUDENT IN AN ORGANIZED HEALTH CARE EDUCATION/TRAINING PROGRAM
Payer: COMMERCIAL

## 2024-10-31 ENCOUNTER — ULTRASOUND ENCOUNTER (OUTPATIENT)
Dept: PERINATAL CARE | Facility: HOSPITAL | Age: 34
End: 2024-10-31
Attending: OBSTETRICS & GYNECOLOGY
Payer: COMMERCIAL

## 2024-10-31 ENCOUNTER — OFFICE VISIT (OUTPATIENT)
Dept: PERINATAL CARE | Facility: HOSPITAL | Age: 34
End: 2024-10-31
Attending: STUDENT IN AN ORGANIZED HEALTH CARE EDUCATION/TRAINING PROGRAM
Payer: COMMERCIAL

## 2024-10-31 VITALS
WEIGHT: 184 LBS | BODY MASS INDEX: 31.41 KG/M2 | DIASTOLIC BLOOD PRESSURE: 87 MMHG | HEART RATE: 79 BPM | SYSTOLIC BLOOD PRESSURE: 127 MMHG | HEIGHT: 64 IN

## 2024-10-31 DIAGNOSIS — Z33.3: ICD-10-CM

## 2024-10-31 DIAGNOSIS — O09.819 PREGNANCY RESULTING FROM IN VITRO FERTILIZATION, ANTEPARTUM (HCC): ICD-10-CM

## 2024-10-31 DIAGNOSIS — O30.049 DICHORIONIC DIAMNIOTIC TWIN PREGNANCY, ANTEPARTUM (HCC): Primary | ICD-10-CM

## 2024-10-31 DIAGNOSIS — O09.521 AMA (ADVANCED MATERNAL AGE) MULTIGRAVIDA 35+, FIRST TRIMESTER (HCC): ICD-10-CM

## 2024-10-31 DIAGNOSIS — O30.049 DICHORIONIC DIAMNIOTIC TWIN PREGNANCY, ANTEPARTUM (HCC): ICD-10-CM

## 2024-10-31 DIAGNOSIS — O30.041 DICHORIONIC DIAMNIOTIC TWIN PREGNANCY IN FIRST TRIMESTER (HCC): Primary | ICD-10-CM

## 2024-10-31 DIAGNOSIS — O30.041 DICHORIONIC DIAMNIOTIC TWIN PREGNANCY IN FIRST TRIMESTER (HCC): ICD-10-CM

## 2024-10-31 DIAGNOSIS — Z33.3 SURROGATE PREGNANCY (HCC): ICD-10-CM

## 2024-10-31 PROCEDURE — 76813 OB US NUCHAL MEAS 1 GEST: CPT | Performed by: OBSTETRICS & GYNECOLOGY

## 2024-10-31 PROCEDURE — 76814 OB US NUCHAL MEAS ADD-ON: CPT

## 2024-10-31 RX ORDER — CHOLECALCIFEROL (VITAMIN D3) 25 MCG
1 TABLET,CHEWABLE ORAL DAILY
COMMUNITY

## 2024-10-31 RX ORDER — ASPIRIN 81 MG/1
81 TABLET ORAL DAILY
COMMUNITY

## 2024-11-04 LAB
GESTATIONAL AGE > OR = 9W:: YES
TEST RESULT: NEGATIVE
TRISOMY 13 (PATAU SYNDROME): NEGATIVE
TRISOMY 18 (EDWARDS SYNDROME): NEGATIVE
TRISOMY 21 (DOWN SYNDROME): NEGATIVE

## 2024-11-07 ENCOUNTER — INITIAL PRENATAL (OUTPATIENT)
Dept: OBGYN CLINIC | Facility: CLINIC | Age: 34
End: 2024-11-07
Payer: COMMERCIAL

## 2024-11-07 ENCOUNTER — ULTRASOUND ENCOUNTER (OUTPATIENT)
Dept: OBGYN CLINIC | Facility: CLINIC | Age: 34
End: 2024-11-07
Payer: COMMERCIAL

## 2024-11-07 ENCOUNTER — LAB ENCOUNTER (OUTPATIENT)
Dept: LAB | Age: 34
End: 2024-11-07
Attending: NURSE PRACTITIONER
Payer: COMMERCIAL

## 2024-11-07 VITALS
DIASTOLIC BLOOD PRESSURE: 60 MMHG | BODY MASS INDEX: 31.31 KG/M2 | SYSTOLIC BLOOD PRESSURE: 114 MMHG | HEART RATE: 77 BPM | WEIGHT: 183.38 LBS | HEIGHT: 64 IN

## 2024-11-07 DIAGNOSIS — O09.529 SUPERVISION OF HIGH-RISK PREGNANCY OF ELDERLY MULTIGRAVIDA (HCC): ICD-10-CM

## 2024-11-07 DIAGNOSIS — O09.819 PREGNANCY RESULTING FROM IN VITRO FERTILIZATION, ANTEPARTUM (HCC): ICD-10-CM

## 2024-11-07 DIAGNOSIS — O09.529 ANTEPARTUM MULTIGRAVIDA OF ADVANCED MATERNAL AGE (HCC): Primary | ICD-10-CM

## 2024-11-07 DIAGNOSIS — O09.529 ANTEPARTUM MULTIGRAVIDA OF ADVANCED MATERNAL AGE (HCC): ICD-10-CM

## 2024-11-07 DIAGNOSIS — O30.049 DICHORIONIC DIAMNIOTIC TWIN PREGNANCY, ANTEPARTUM (HCC): ICD-10-CM

## 2024-11-07 DIAGNOSIS — O30.049 DICHORIONIC DIAMNIOTIC TWIN PREGNANCY, ANTEPARTUM (HCC): Primary | ICD-10-CM

## 2024-11-07 LAB
ALBUMIN SERPL-MCNC: 4.2 G/DL (ref 3.2–4.8)
ALBUMIN/GLOB SERPL: 1.4 {RATIO} (ref 1–2)
ALP LIVER SERPL-CCNC: 84 U/L
ALT SERPL-CCNC: 16 U/L
ANION GAP SERPL CALC-SCNC: 4 MMOL/L (ref 0–18)
ANTIBODY SCREEN: NEGATIVE
AST SERPL-CCNC: 17 U/L (ref ?–34)
BASOPHILS # BLD AUTO: 0.03 X10(3) UL (ref 0–0.2)
BASOPHILS NFR BLD AUTO: 0.4 %
BILIRUB SERPL-MCNC: 0.3 MG/DL (ref 0.3–1.2)
BUN BLD-MCNC: 8 MG/DL (ref 9–23)
CALCIUM BLD-MCNC: 10.1 MG/DL (ref 8.7–10.4)
CHLORIDE SERPL-SCNC: 105 MMOL/L (ref 98–112)
CO2 SERPL-SCNC: 27 MMOL/L (ref 21–32)
CREAT BLD-MCNC: 0.7 MG/DL
CREAT UR-SCNC: 46.2 MG/DL
EGFRCR SERPLBLD CKD-EPI 2021: 115 ML/MIN/1.73M2 (ref 60–?)
EOSINOPHIL # BLD AUTO: 0.08 X10(3) UL (ref 0–0.7)
EOSINOPHIL NFR BLD AUTO: 1 %
ERYTHROCYTE [DISTWIDTH] IN BLOOD BY AUTOMATED COUNT: 13 %
FASTING STATUS PATIENT QL REPORTED: YES
GLOBULIN PLAS-MCNC: 2.9 G/DL (ref 2–3.5)
GLUCOSE BLD-MCNC: 76 MG/DL (ref 70–99)
HBV SURFACE AG SER-ACNC: <0.1 [IU]/L
HBV SURFACE AG SERPL QL IA: NONREACTIVE
HCT VFR BLD AUTO: 38.7 %
HCV AB SERPL QL IA: NONREACTIVE
HGB BLD-MCNC: 12.6 G/DL
IMM GRANULOCYTES # BLD AUTO: 0.03 X10(3) UL (ref 0–1)
IMM GRANULOCYTES NFR BLD: 0.4 %
LYMPHOCYTES # BLD AUTO: 1.33 X10(3) UL (ref 1–4)
LYMPHOCYTES NFR BLD AUTO: 16.4 %
MCH RBC QN AUTO: 28.6 PG (ref 26–34)
MCHC RBC AUTO-ENTMCNC: 32.6 G/DL (ref 31–37)
MCV RBC AUTO: 87.8 FL
MONOCYTES # BLD AUTO: 0.76 X10(3) UL (ref 0.1–1)
MONOCYTES NFR BLD AUTO: 9.4 %
NEUTROPHILS # BLD AUTO: 5.87 X10 (3) UL (ref 1.5–7.7)
NEUTROPHILS # BLD AUTO: 5.87 X10(3) UL (ref 1.5–7.7)
NEUTROPHILS NFR BLD AUTO: 72.4 %
OSMOLALITY SERPL CALC.SUM OF ELEC: 279 MOSM/KG (ref 275–295)
PLATELET # BLD AUTO: 333 10(3)UL (ref 150–450)
POTASSIUM SERPL-SCNC: 3.8 MMOL/L (ref 3.5–5.1)
PROT SERPL-MCNC: 7.1 G/DL (ref 5.7–8.2)
PROT UR-MCNC: <6 MG/DL (ref ?–14)
RBC # BLD AUTO: 4.41 X10(6)UL
RH BLOOD TYPE: NEGATIVE
RUBV IGG SER QL: POSITIVE
RUBV IGG SER-ACNC: 60 IU/ML (ref 10–?)
SODIUM SERPL-SCNC: 136 MMOL/L (ref 136–145)
T PALLIDUM AB SER QL IA: NONREACTIVE
WBC # BLD AUTO: 8.1 X10(3) UL (ref 4–11)

## 2024-11-07 PROCEDURE — 80053 COMPREHEN METABOLIC PANEL: CPT

## 2024-11-07 PROCEDURE — 86803 HEPATITIS C AB TEST: CPT

## 2024-11-07 PROCEDURE — 87340 HEPATITIS B SURFACE AG IA: CPT

## 2024-11-07 PROCEDURE — 87086 URINE CULTURE/COLONY COUNT: CPT

## 2024-11-07 PROCEDURE — 87389 HIV-1 AG W/HIV-1&-2 AB AG IA: CPT

## 2024-11-07 PROCEDURE — 86762 RUBELLA ANTIBODY: CPT

## 2024-11-07 PROCEDURE — 3074F SYST BP LT 130 MM HG: CPT | Performed by: NURSE PRACTITIONER

## 2024-11-07 PROCEDURE — 3078F DIAST BP <80 MM HG: CPT | Performed by: NURSE PRACTITIONER

## 2024-11-07 PROCEDURE — 3008F BODY MASS INDEX DOCD: CPT | Performed by: NURSE PRACTITIONER

## 2024-11-07 PROCEDURE — 86900 BLOOD TYPING SEROLOGIC ABO: CPT

## 2024-11-07 PROCEDURE — 86780 TREPONEMA PALLIDUM: CPT

## 2024-11-07 PROCEDURE — 86901 BLOOD TYPING SEROLOGIC RH(D): CPT

## 2024-11-07 PROCEDURE — 86850 RBC ANTIBODY SCREEN: CPT

## 2024-11-07 PROCEDURE — 76815 OB US LIMITED FETUS(S): CPT | Performed by: OBSTETRICS & GYNECOLOGY

## 2024-11-07 PROCEDURE — 85025 COMPLETE CBC W/AUTO DIFF WBC: CPT

## 2024-11-07 PROCEDURE — 84156 ASSAY OF PROTEIN URINE: CPT

## 2024-11-07 PROCEDURE — 82570 ASSAY OF URINE CREATININE: CPT

## 2024-11-07 PROCEDURE — 36415 COLL VENOUS BLD VENIPUNCTURE: CPT

## 2024-11-07 NOTE — PROGRESS NOTES
Here for initial prenatal visit with our group.  34 year old  at 13w2d by implantation.    Patient's last menstrual period was 2024 (approximate).    Last pap smear was 2024 and it was normal.    This pregnancy is complicated by twin gestation Di/Di, advanced maternal age (the egg retrieval however was from a 23 year old,) and IVF. She is a gestational carrier.'  She has no first degree relatives with Diabetes, she has no history of Gestational Diabetes.     OB History    Para Term  AB Living   3 2 2     2   SAB IAB Ectopic Multiple Live Births           2      # Outcome Date GA Lbr Kelvin/2nd Weight Sex Type Anes PTL Lv   3 Current            2 Term 16 40w0d  8 lb 1 oz (3.657 kg) F Vag-Spont EPI  MATEUSZ   1 Term 14 40w0d  8 lb (3.629 kg) F Vag-Spont EPI  MATEUSZ       ROS:  Reviewed, all wnl    Please see prenatal physical exam tab for initial OB physical exam  US: please see US tab  She does note GERD, vomits about every 2 weeks    Prenatal course and care discussed with patient: visits, labs, HIV, AFP, sonograms, GL, GBS, restrictions.  Pamphlets given. Genetic counseling done at prior visit regarding Cystic fibrosis and SMA carrier screen, First trimester screen/NT screen, CVS, QS + midtrimester sonogram for markers, amniocentesis. Pt had the NIPT down with MFM. They have done first trimester ultrasound and will see her for a cervical length at 16 weeks.    A/P:  1. Antepartum multigravida of advanced maternal age (HCC)  She has upcoming visits with MFM  CMP  Continue BASA  Baseline urine for protein/ Creatine random    2. Dichorionic diamniotic twin pregnancy, antepartum (HCC)  Per #1  Cervical length U/S at 16 weeks  Level II U/S at 20 weeks  Fetal echocardiogram at 24 weeks  Monthly growth ultrasounds in the third trimester  Weekly NST's at 32 weeks.  Monitor for signs or symptoms of  labor, preeclampsia or fetal growth disturbances.  In the absence of other maternal or  fetal indications, delivery advised at 38 weeks gestation.    3. Pregnancy resulting from in vitro fertilization, antepartum (HCC)  Per #1    4. Supervision of high-risk pregnancy of elderly multigravida (HCC)  - Type and screen; Future  - CBC W Differential W Platelet; Future  - Hepatitis B Surface Antigen; Future  - T Pallidum Screening [place]; Future  - Rubella, IGG; Future  - HIV AG AB COMBO; Future  - Urine Culture, Routine; Future  - HCV Antibody; Future    Discussed PPI, Lansoprazole for GERD    SHILPA 4 weeks/ prn

## 2024-11-11 PROBLEM — Z67.91 RH NEGATIVE STATE IN ANTEPARTUM PERIOD (HCC): Status: ACTIVE | Noted: 2024-11-11

## 2024-11-11 PROBLEM — O26.899 RH NEGATIVE STATE IN ANTEPARTUM PERIOD (HCC): Status: ACTIVE | Noted: 2024-11-11

## 2024-12-04 ENCOUNTER — ULTRASOUND ENCOUNTER (OUTPATIENT)
Dept: PERINATAL CARE | Facility: HOSPITAL | Age: 34
End: 2024-12-04
Attending: OBSTETRICS & GYNECOLOGY
Payer: COMMERCIAL

## 2024-12-04 VITALS
HEIGHT: 64 IN | SYSTOLIC BLOOD PRESSURE: 121 MMHG | HEART RATE: 88 BPM | DIASTOLIC BLOOD PRESSURE: 82 MMHG | BODY MASS INDEX: 32.27 KG/M2 | WEIGHT: 189 LBS

## 2024-12-04 DIAGNOSIS — O09.522 MULTIGRAVIDA OF ADVANCED MATERNAL AGE IN SECOND TRIMESTER (HCC): ICD-10-CM

## 2024-12-04 DIAGNOSIS — Z33.3 SURROGATE PREGNANCY (HCC): ICD-10-CM

## 2024-12-04 DIAGNOSIS — Z03.75 SUSPECTED SHORTENING OF CERVIX NOT FOUND: ICD-10-CM

## 2024-12-04 DIAGNOSIS — O09.812 PREGNANCY RESULTING FROM IN VITRO FERTILIZATION IN SECOND TRIMESTER (HCC): ICD-10-CM

## 2024-12-04 DIAGNOSIS — O09.529 AMA (ADVANCED MATERNAL AGE) MULTIGRAVIDA 35+ (HCC): ICD-10-CM

## 2024-12-04 DIAGNOSIS — O09.819 PREGNANCY RESULTING FROM IN VITRO FERTILIZATION, ANTEPARTUM (HCC): ICD-10-CM

## 2024-12-04 DIAGNOSIS — O30.049 DICHORIONIC DIAMNIOTIC TWIN PREGNANCY, ANTEPARTUM (HCC): ICD-10-CM

## 2024-12-04 DIAGNOSIS — O30.042 DICHORIONIC DIAMNIOTIC TWIN PREGNANCY IN SECOND TRIMESTER (HCC): Primary | ICD-10-CM

## 2024-12-04 PROCEDURE — 76817 TRANSVAGINAL US OBSTETRIC: CPT | Performed by: OBSTETRICS & GYNECOLOGY

## 2024-12-04 PROCEDURE — 76815 OB US LIMITED FETUS(S): CPT

## 2024-12-04 NOTE — PROGRESS NOTES
Outpatient Maternal-Fetal Medicine Consultation    Dear Dr. Jimenez    Thank you for requesting ultrasound evaluation and maternal fetal medicine consultation on your patient Krista Collado.  As you are aware she is a 34 year old female  with a twin pregnancy and an Estimated Date of Delivery: 25 A maternal-fetal medicine  f/u is today. Her prenatal records and labs were reviewed.    Feeling well.  No concerns at this time.    HISTORY  # 1 - Date: 14, Sex: Female, Weight: 8 lb (3.629 kg), GA: 40w0d, Type: Vaginal, Spontaneous, Apgar1: None, Apgar5: None, Living: Living, Birth Comments: None    # 2 - Date: 16, Sex: Female, Weight: 8 lb 1 oz (3.657 kg), GA: 40w0d, Type: Vaginal, Spontaneous, Apgar1: None, Apgar5: None, Living: Living, Birth Comments: None    # 3 - Date: None, Sex: None, Weight: None, GA: None, Type: None, Apgar1: None, Apgar5: None, Living: None, Birth Comments: None      Past Medical History  The patient  has no past medical history on file.    Past Surgical History  The patient  has no past surgical history on file.    Family History  The patient has no family status information on file.       Medications:   Current Outpatient Medications:     prenatal vitamin with DHA 27-0.8-228 MG Oral Cap, Take 1 capsule by mouth daily., Disp: , Rfl:     aspirin 81 MG Oral Tab EC, Take 1 tablet (81 mg total) by mouth daily., Disp: , Rfl:   Allergies: Allergies[1]    PHYSICAL EXAMINATION:  /82 (BP Location: Right arm, Patient Position: Sitting, Cuff Size: adult)   Pulse 88   Ht 5' 4\" (1.626 m)   Wt 189 lb (85.7 kg)   LMP 2024 (Approximate)   BMI 32.44 kg/m²   General: alert and oriented,no acute distress  Abdomen: gravid, soft, non-tender      OBSTETRIC ULTRASOUND  Twin A - IUP in breech presentation.    Placenta is anterior.   Cardiac activity is present, 137 bpm  MVP is 3.2 cm.     Twin B - IUP in breech right presentation.   Placenta is anterior.  Cardiac activity is  present, 147 bpm  MVP is 3.2 cm.     The cervix was not able to be clearly visualized and appeared short by transabdominal ultrasound, therefore transvaginal ultrasound was performed. Transvaginal US findings: Cervix is closed, long and no funneling seen measuring 34.4 mm   See PACS/Imaging Tab For Complete Ultrasound Report  I interpreted the results and reviewed them with the patient.    DISCUSSION  During her visit we discussed and reviewed the following issues:  ADVANCED MATERNAL AGE    Background  I reviewed with the patient that pregnancies in women of advanced maternal age (35 or older at delivery) are associated with elevated risks.  Specifically, there is a higher rate of:    Fetal malformations  Preeclampsia  Gestational diabetes  Intrauterine fetal death   Please see previous MFM detailed discussion.     The patient ultimately elected to obtain NIPT (cell-free fetal DNA assessment).  We expect the results to be available within 7 days; the patient will be informed when the results are available.    DIAMNIOTIC DICHORIONIC TWIN GESTATION     Please see previous M detailed discussion.     IVF GESTATION  Conception by IVF Please see previous [facility] detailed discussion.     IMPRESSION:  IUP at 17w1d   Dichorionic Diamniotic Twin Gestation  AMA: low-risk cell free fetal DNA, declined invasive testing male female pair -however oocyte from 23-year-old and patient reports low risk PGT  IVF Gestation  Gestational Carrier  Short cervix not found.    RECOMMENDATIONS:  Continue care with Dr. Jimenez  Level II U/S at 20 weeks  Fetal echocardiogram at 24 weeks  Monthly growth ultrasounds in the third trimester  Weekly NST's at 32 weeks.  Monitor for signs or symptoms of  labor, preeclampsia or fetal growth disturbances.  In the absence of other maternal or fetal indications, delivery advised at 38 weeks gestation.    Thank you for allowing me to participate in the care of your patient.  Please do not hesitate  to contact me if additional questions or concerns arise.      Jodi Ibarra MD     20 minutes spent in review of records, patient consultation, documentation and coordination of care.  The relevant clinical matter(s) are summarized above.     Note to patient and family  The 21st Century Cures Act makes medical notes available to patients in the interest of transparency.  However, please be advised that this is a medical document.  It is intended as nxgi-jk-cuvf communication.  It is written and medical language may contain abbreviations or verbiage that are technical and unfamiliar.  It may appear blunt or direct.  Medical documents are intended to carry relevant information, facts as evident, and the clinical opinion of the practitioner.         [1] No Known Allergies

## 2024-12-04 NOTE — PROGRESS NOTES
Pt here for Cervical Length/Di Di Twin  Flutters x 1 occurrence felt per patient  Pt denies complaints.

## 2024-12-05 ENCOUNTER — ROUTINE PRENATAL (OUTPATIENT)
Dept: OBGYN CLINIC | Facility: CLINIC | Age: 34
End: 2024-12-05
Payer: COMMERCIAL

## 2024-12-05 ENCOUNTER — LAB ENCOUNTER (OUTPATIENT)
Dept: LAB | Age: 34
End: 2024-12-05
Attending: NURSE PRACTITIONER
Payer: COMMERCIAL

## 2024-12-05 VITALS
SYSTOLIC BLOOD PRESSURE: 118 MMHG | WEIGHT: 188.81 LBS | HEART RATE: 107 BPM | DIASTOLIC BLOOD PRESSURE: 72 MMHG | BODY MASS INDEX: 32 KG/M2

## 2024-12-05 DIAGNOSIS — O30.049 DICHORIONIC DIAMNIOTIC TWIN PREGNANCY, ANTEPARTUM (HCC): ICD-10-CM

## 2024-12-05 DIAGNOSIS — Z36.8A ENCOUNTER FOR ANTENATAL SCREENING FOR OTHER GENETIC DEFECT (HCC): Primary | ICD-10-CM

## 2024-12-05 DIAGNOSIS — Z36.8A ENCOUNTER FOR ANTENATAL SCREENING FOR OTHER GENETIC DEFECT (HCC): ICD-10-CM

## 2024-12-05 DIAGNOSIS — O09.819 PREGNANCY RESULTING FROM IN VITRO FERTILIZATION, ANTEPARTUM (HCC): ICD-10-CM

## 2024-12-05 PROCEDURE — 3078F DIAST BP <80 MM HG: CPT | Performed by: NURSE PRACTITIONER

## 2024-12-05 PROCEDURE — 36415 COLL VENOUS BLD VENIPUNCTURE: CPT

## 2024-12-05 PROCEDURE — 82105 ALPHA-FETOPROTEIN SERUM: CPT

## 2024-12-05 PROCEDURE — 3074F SYST BP LT 130 MM HG: CPT | Performed by: NURSE PRACTITIONER

## 2024-12-05 NOTE — PROGRESS NOTES
SHILPA  FM- possible x 1  /147  Doing well, no concerns or questions  No complaints. No LOF/VB/uctx  Limited Us with MFM yesterday with normal  Genetic testing- AFP ordeed  Anatomy Scan with MFM at 20 weeks  SHILPA 4 weeks

## 2024-12-07 LAB
AFP MOM: 2.86
AFP VALUE: 97.1 NG/ML
GA ON COLL DATE: 17.3 WEEKS
INSULIN DEP AFP: NO
MAT AGE AT EDD: 37 YR
OSBR RISK 1 IN AFP: 440
WEIGHT AFP: 188 LBS

## 2024-12-18 NOTE — PROGRESS NOTES
Outpatient Maternal-Fetal Medicine Follow-Up    Dear Dr. Jimenez    Thank you for requesting ultrasound evaluation and maternal fetal medicine consultation on your patient Krista Collado.  As you are aware she is a 34 year old female  with a twin pregnancy and an Estimated Date of Delivery: 25.  She returned to maternal-fetal medicine today for a follow-up visit.  Her history was reviewed from her prior visit and there were no interval changes.    Antepartum Risk Factors  Dichorionic Diamniotic Twin Gestation  AMA:low-risk free fetal DNA  , declined invasive testing -however oocyte from 23-year-old and patient reports low risk PGT  IVF Gestation  Gestational Carrier    PHYSICAL EXAMINATION:  /84 (BP Location: Right arm, Patient Position: Sitting, Cuff Size: adult)   Pulse 114   Ht 5' 4\" (1.626 m)   Wt 191 lb (86.6 kg)   LMP 2024 (Approximate)   BMI 32.79 kg/m²   General: alert and oriented, no acute distress  Abdomen: gravid, soft, non-tender  Extremities: non-tender, no edema    OBSTETRIC ULTRASOUND    Ultrasound findings:     The cervix was not able to be clearly visualized and appeared short by transabdominal ultrasound, therefore transvaginal ultrasound was performed. Transvaginal US findings: Cervix is closed, long and no funneling seen measuring 46.4 mm     Twin A: The fetal measurements are consistent with the established EDC. No ultrasound evidence of structural abnormalities are seen today. The nasal bone is present. No ultrasound evidence of markers for aneuploidy are seen. She understands that ultrasound exam cannot exclude genetic abnormalities and that genetic testing is recommended. The limitations of ultrasound were discussed.    Twin A - IUP in breech presentation.    Placenta is anterior.   Cardiac activity is present, 134 bpm   g ( 0 lb 12 oz)   MVP is 4.9 cm.     Twin B - IUP in breech presentation.   Placenta is anterior.   Cardiac activity is present, 151  bpm   g (0 lb 11 oz)  MVP is 4.2 cm.     Discordance - 9.1 %    Twin B:  The fetal measurements are consistent with the established EDC. No ultrasound evidence of structural abnormalities are seen today. The nasal bone is present. No ultrasound evidence of markers for aneuploidy are seen. She understands that ultrasound exam cannot exclude genetic abnormalities and that genetic testing is recommended. The limitations of ultrasound were discussed.      See PACS/Imaging Tab For Complete Ultrasound Report  I interpreted the results and reviewed them with the patient.    DISCUSSION  During her visit we discussed and reviewed the following issues:  ADVANCED MATERNAL AGE  See prior MFM notes for a detailed review.  She did not desire invasive genetic testing.   She has already obtained a low-risk NPIT result and was appropriately reassured.     IVF GESTATION  See prior MFM notes for a detailed review.    DICHORIONIC DIAMNIOTIC TWIN GESTATION - follow-up  Appropriate interval growth is noted.  The patient denies any signs or symptoms of  labor. There is no clinical evidence of preeclampsia.  See follow-up recommendations below.    IMPRESSION:  IUP at 20w2d  Dichorionic Diamniotic Twin Gestation  AMA:low-risk free fetal DNA  , declined invasive testing -however oocyte from 23-year-old and patient reports low risk PGT  IVF Gestation  Gestational Carrier    RECOMMENDATIONS:  Continue care with Dr. Jimenez  Fetal echocardiogram at 24 weeks  Monthly growth ultrasounds in the third trimester  Weekly NST's at 32 weeks.  Monitor for signs or symptoms of  labor, preeclampsia or fetal growth disturbances.  In the absence of other maternal or fetal indications, delivery advised at 38 weeks gestation.    Thank you for allowing me to participate in the care of your patient.  Please do not hesitate to contact me if additional questions or concerns arise.      Malcom Acosta M.D.    40 minutes spent in review of  records, patient consultation, documentation and coordination of care.  The relevant clinical matter(s) are summarized above.     Note to patient and family  The 21st Century Cures Act makes medical notes available to patients in the interest of transparency.  However, please be advised that this is a medical document.  It is intended as dkmn-qc-mefh communication.  It is written and medical language may contain abbreviations or verbiage that are technical and unfamiliar.  It may appear blunt or direct.  Medical documents are intended to carry relevant information, facts as evident, and the clinical opinion of the practitioner.

## 2024-12-26 ENCOUNTER — OFFICE VISIT (OUTPATIENT)
Dept: PERINATAL CARE | Facility: HOSPITAL | Age: 34
End: 2024-12-26
Attending: OBSTETRICS & GYNECOLOGY
Payer: COMMERCIAL

## 2024-12-26 ENCOUNTER — ROUTINE PRENATAL (OUTPATIENT)
Dept: OBGYN CLINIC | Facility: CLINIC | Age: 34
End: 2024-12-26
Payer: COMMERCIAL

## 2024-12-26 VITALS
BODY MASS INDEX: 32.61 KG/M2 | HEART RATE: 114 BPM | HEIGHT: 64 IN | WEIGHT: 191 LBS | SYSTOLIC BLOOD PRESSURE: 129 MMHG | DIASTOLIC BLOOD PRESSURE: 84 MMHG

## 2024-12-26 VITALS
HEART RATE: 98 BPM | BODY MASS INDEX: 33 KG/M2 | WEIGHT: 192 LBS | SYSTOLIC BLOOD PRESSURE: 124 MMHG | DIASTOLIC BLOOD PRESSURE: 80 MMHG

## 2024-12-26 DIAGNOSIS — Z33.3 SURROGATE PREGNANCY (HCC): ICD-10-CM

## 2024-12-26 DIAGNOSIS — O30.049 DICHORIONIC DIAMNIOTIC TWIN PREGNANCY, ANTEPARTUM (HCC): ICD-10-CM

## 2024-12-26 DIAGNOSIS — O09.812 PREGNANCY RESULTING FROM ASSISTED REPRODUCTIVE TECHNOLOGY, SECOND TRIMESTER (HCC): ICD-10-CM

## 2024-12-26 DIAGNOSIS — Z34.92 SECOND TRIMESTER PREGNANCY (HCC): Primary | ICD-10-CM

## 2024-12-26 DIAGNOSIS — O30.049 DICHORIONIC DIAMNIOTIC TWIN PREGNANCY, ANTEPARTUM (HCC): Primary | ICD-10-CM

## 2024-12-26 DIAGNOSIS — O09.522 AMA (ADVANCED MATERNAL AGE) MULTIGRAVIDA 35+, SECOND TRIMESTER (HCC): ICD-10-CM

## 2024-12-26 DIAGNOSIS — O30.042 DICHORIONIC DIAMNIOTIC TWIN PREGNANCY IN SECOND TRIMESTER (HCC): ICD-10-CM

## 2024-12-26 PROCEDURE — 76811 OB US DETAILED SNGL FETUS: CPT | Performed by: OBSTETRICS & GYNECOLOGY

## 2024-12-26 PROCEDURE — 3074F SYST BP LT 130 MM HG: CPT | Performed by: OBSTETRICS & GYNECOLOGY

## 2024-12-26 PROCEDURE — 76812 OB US DETAILED ADDL FETUS: CPT

## 2024-12-26 PROCEDURE — 76817 TRANSVAGINAL US OBSTETRIC: CPT

## 2024-12-26 PROCEDURE — 3079F DIAST BP 80-89 MM HG: CPT | Performed by: OBSTETRICS & GYNECOLOGY

## 2024-12-26 NOTE — PROGRESS NOTES
Pt here for Level II Ultrasound/Cervical Length  +flutters noted per patient  Pt denies complaints.

## 2024-12-26 NOTE — PROGRESS NOTES
SHILPA  34 year old yo, , at GA 20w2d    Vitals:    24 1447   BP: 124/80   Pulse: 98   Weight: 192 lb (87.1 kg)       Doing well. Denies LOF/VB/uctx. +FM.     RH negative  S/P Level 2 AUS and MFM consultation today   1 hr glucose and CBC (24-28wks)    Fetus A: boy - RLQ -149 bpm   Fetus B: girl - LUQ -142 bpm       Assessment:     Krista Collado is a 34 year old  at 20w2d who presents for routine OB visit. Overall doing well.     Problem List Items Addressed This Visit    None  Visit Diagnoses       Second trimester pregnancy ([facility])    -  Primary    Dichorionic diamniotic twin pregnancy in second trimester ([facility])                    Plan:     Patient Active Problem List    Diagnosis    Pregnancy resulting from assisted reproductive technology, second trimester ([facility]) [O09.812]    Rh negative state in antepartum period ([facility])     Will need second trimester antibody screen and Rhogam      Surrogate pregnancy ([facility])     IVF pregnancy with frozen embryo x 2. Parents live in [place].   [ ] medical records       Pregnancy resulting from in vitro fertilization, antepartum ([facility])     [X] MFM      AMA (advanced maternal age) multigravida 35+, second trimester ([facility])     Low dose ASA  [X] MFM      Obesity affecting pregnancy, antepartum ([facility])     Low dose ASA      Dichorionic diamniotic twin pregnancy, antepartum ([facility])     [x] s/p MFM consult 10/31  Normal baseline PIH labs  MFM Recommendations:  Fetal echocardiogram at 24 weeks  Monthly growth ultrasounds in the third trimester  Weekly NST's at 32 weeks.  Monitor for signs or symptoms of  labor, preeclampsia or fetal growth disturbances.  In the absence of other maternal or fetal indications, delivery advised at 38 weeks gestation.      Nausea and vomiting in pregnancy ([facility])       - continue routine prenatal care   - labor and rupture of membrane precautions provided  -Fetal movement instructions given    Return to clinic in  4 weeks for SHILPA visit        Johan Georges MD   [facility] - OBGYN      Note to patient and family   The 21st Century Cures Act makes medical notes available to patients in the interest of transparency.  However, please be advised that this is a medical document.  It is intended as qwex-xz-ifgi communication.  It is written and medical language may contain abbreviations or verbiage that are technical and unfamiliar.  It may appear blunt or direct.  Medical documents are intended to carry relevant information, facts as evident, and the clinical opinion of the practitioner.

## 2024-12-26 NOTE — PATIENT INSTRUCTIONS
SCREENING FOR GESTATIONAL DIABETES    Pregnancy is a stress to your body.  One way in which the stress may manifest itself is through an abnormality in sugar metabolism.  In non-pregnant patients, this abnormality (a lack of insulin) is known as diabetes.    During pregnancy, the insulin normally secreted may become ineffective because of the various hormones and enzymes produced by the placenta.  This leads to a condition known as gestational diabetes, Class A diabetes, or pregnancy induced glucose intolerance (PIGI).  This condition disappears after delivery.  It is known to occur in approximately 5-10% of all pregnancies.  It is important because we know that increased maternal blood glucose (sugar) levels can cause fetal problems.  That is, the morbidity and mortality rates can be doubled among pregnancies complicated by undiagnosed gestational diabetes.    We screen all patients for gestational diabetes at around 24 weeks of pregnancy.  The reason we wait until this time is that the stresses of pregnancy leading to diabetes become maximal around this time, therefore, the chance of a false negative test is minimized.  If the diabetes is detected at this time, it gives us ample time to correct the glucose metabolism and hopefully, decrease  morbidity and mortality.    An order will be placed and the screening will be done in an outpatient lab facility.  Please call the lab to schedule an appointment for this testing, the outpatient labs do not take walk-ins due to the length of this test.  On the day of your test, we suggest you eat a normal breakfast, but eliminate pastries/doughnuts the morning of your test.  We also recommend that you do not eat or drink anything with sugar in it 2 hours prior to your test.   Upon arrival at the outpatient laboratory facility, you will be given a 50 gm carbohydrate drink.  A blood glucose level will then be drawn one hour after ingestion.    If the blood glucose value  is normal and no other risk factors exist, no further testing is necessary.  If the blood glucose is abnormal, this indicates an increased risk of gestational diabetes and will require a formal three hour glucose tolerance test.  This test will be done within two weeks following your abnormal one hour screen. Approximately 15% of patients with positive screening tests will have an abnormal three hour test glucose tolerance test.    If you have any questions regarding this testing, please feel free to contact us.  If gestational diabetes is detected, it will be thoroughly explained to you and discussion on management will take place with one of our physicians.    *If your preferred Lab is [facility], you may choose either the [place] location or the [facility], [address] for your testing.  You are also welcome to coordinate your lab appointment with your 24 week obstetric appointment so that you may spend your one hour wait time in our office rather than the lab facility.

## 2025-01-22 ENCOUNTER — ULTRASOUND ENCOUNTER (OUTPATIENT)
Dept: PERINATAL CARE | Facility: HOSPITAL | Age: 35
End: 2025-01-22
Attending: OBSTETRICS & GYNECOLOGY
Payer: COMMERCIAL

## 2025-01-22 ENCOUNTER — ROUTINE PRENATAL (OUTPATIENT)
Dept: OBGYN CLINIC | Facility: CLINIC | Age: 35
End: 2025-01-22
Payer: COMMERCIAL

## 2025-01-22 VITALS
WEIGHT: 199 LBS | HEART RATE: 84 BPM | HEIGHT: 64 IN | SYSTOLIC BLOOD PRESSURE: 124 MMHG | DIASTOLIC BLOOD PRESSURE: 78 MMHG | BODY MASS INDEX: 33.97 KG/M2

## 2025-01-22 VITALS
WEIGHT: 199.5 LBS | DIASTOLIC BLOOD PRESSURE: 78 MMHG | HEIGHT: 64 IN | SYSTOLIC BLOOD PRESSURE: 128 MMHG | BODY MASS INDEX: 34.06 KG/M2 | HEART RATE: 86 BPM

## 2025-01-22 DIAGNOSIS — Z67.91 RH NEGATIVE STATE IN ANTEPARTUM PERIOD (HCC): ICD-10-CM

## 2025-01-22 DIAGNOSIS — O99.212 OTHER OBESITY DUE TO EXCESS CALORIES AFFECTING PREGNANCY IN SECOND TRIMESTER (HCC): ICD-10-CM

## 2025-01-22 DIAGNOSIS — O30.042 DICHORIONIC DIAMNIOTIC TWIN PREGNANCY IN SECOND TRIMESTER (HCC): ICD-10-CM

## 2025-01-22 DIAGNOSIS — O09.812 PREGNANCY RESULTING FROM IN VITRO FERTILIZATION IN SECOND TRIMESTER (HCC): Primary | ICD-10-CM

## 2025-01-22 DIAGNOSIS — O30.049 DICHORIONIC DIAMNIOTIC TWIN PREGNANCY, ANTEPARTUM (HCC): ICD-10-CM

## 2025-01-22 DIAGNOSIS — Z33.3 SURROGATE PREGNANCY (HCC): ICD-10-CM

## 2025-01-22 DIAGNOSIS — Z34.90 PRENATAL CARE, ANTEPARTUM (HCC): Primary | ICD-10-CM

## 2025-01-22 DIAGNOSIS — O09.522 MULTIGRAVIDA OF ADVANCED MATERNAL AGE IN SECOND TRIMESTER (HCC): ICD-10-CM

## 2025-01-22 DIAGNOSIS — O26.899 RH NEGATIVE STATE IN ANTEPARTUM PERIOD (HCC): ICD-10-CM

## 2025-01-22 DIAGNOSIS — Z36.9 ANTENATAL SCREENING ENCOUNTER (HCC): ICD-10-CM

## 2025-01-22 DIAGNOSIS — E66.09 OTHER OBESITY DUE TO EXCESS CALORIES AFFECTING PREGNANCY IN SECOND TRIMESTER (HCC): ICD-10-CM

## 2025-01-22 PROBLEM — F32.2 MAJOR DEPRESSIVE DISORDER, SINGLE EPISODE, SEVERE WITHOUT PSYCHOTIC FEATURES (HCC): Status: ACTIVE | Noted: 2023-02-15

## 2025-01-22 PROCEDURE — 3074F SYST BP LT 130 MM HG: CPT | Performed by: OBSTETRICS & GYNECOLOGY

## 2025-01-22 PROCEDURE — 3008F BODY MASS INDEX DOCD: CPT | Performed by: OBSTETRICS & GYNECOLOGY

## 2025-01-22 PROCEDURE — 3078F DIAST BP <80 MM HG: CPT | Performed by: OBSTETRICS & GYNECOLOGY

## 2025-01-22 PROCEDURE — 76825 ECHO EXAM OF FETAL HEART: CPT

## 2025-01-22 PROCEDURE — 76827 ECHO EXAM OF FETAL HEART: CPT

## 2025-01-22 PROCEDURE — 93325 DOPPLER ECHO COLOR FLOW MAPG: CPT

## 2025-01-22 PROCEDURE — 76825 ECHO EXAM OF FETAL HEART: CPT | Performed by: OBSTETRICS & GYNECOLOGY

## 2025-01-22 NOTE — PROGRESS NOTES
Chief Complaint   Patient presents with    Prenatal Care     Gerardo visit w/EPDS screening      Routine prenatal visit.  Patient without complaints.  Good fetal movement  Patient denies any bleeding, leaking fluid, cramping, or contractions.    Assessment/Plan:  24w1d doing well  1 hour GTT, Ab screen CBC ordered.  Blood type O neg- Rhogam 28 wks  Twin gestation/Obesity- monthly growth, NST 32 wks.  Tdap next visit  Surrogate pregnancy  Reviewed  labor signs and symptoms.    Diagnoses and all orders for this visit:    Prenatal care, antepartum ([facility]CC)     screening encounter ([facility])  -     CBC (with DIFF, Platelet) Reflex to Ferritin; Future  -     Glucose 1 HR OB; Future  -     ANTIBODY SCREEN; Future    Rh negative state in antepartum period ([facility])  -     ANTIBODY SCREEN; Future      Return in about 4 weeks (around 2025) for Routine Prenatal Visit, Labs Next Available, NST 32 wks..

## 2025-01-22 NOTE — PROGRESS NOTES
Outpatient Maternal-Fetal Medicine Follow-Up    Dear Dr. Jimenez    Thank you for requesting ultrasound evaluation and maternal fetal medicine consultation on your patient Kritsa Collado.  As you are aware she is a 34 year old female  with a twin pregnancy and an Estimated Date of Delivery: 25.  She returned to maternal-fetal medicine today for a follow-up visit.  Her history was reviewed from her prior visit and there were no interval changes.    No new changes.  Antepartum Risk Factors  Dichorionic Diamniotic Twin Gestation  AMA:low-risk free fetal DNA  , declined invasive testing -however oocyte from 23-year-old and patient reports low risk PGT  IVF Gestation  Gestational Carrier    PHYSICAL EXAMINATION:  /78 (BP Location: Right arm, Patient Position: Sitting, Cuff Size: adult)   Pulse 84   Ht 5' 4\" (1.626 m)   Wt 199 lb (90.3 kg)   LMP 2024 (Approximate)   BMI 34.16 kg/m²   General: alert and oriented, no acute distress  Abdomen: gravid, soft, non-tender      OBSTETRIC ULTRASOUND  FETAL ECHOCARDIOGRAM:  Twin A - IUP in cephalic presentation.    Placenta is anterior.   Cardiac activity is present, 140 bpm   MVP is 4.6 cm.     Twin A: A transabdominal 2D Doppler, fetal echocardiogram is performed. There is a four-chamber heart of appropriate cardiac dimensions. There is situs solitus with levocardia. Intracardiac connection appear to be normal.  The  arterioventricular valves appear normal. There is no evidence of pericardial or pleural effusion. The A-V conduction  is one to one and the heart rate is appropriate for gestational age. No evidence of fetal arrhythmias is seen during today's study. There is a right to left shunting across the patent mickie ovale. There is a normal appearance of the aorta and branching pattern of the head vessels.    Normal fetal Doppler interrogations    There appears to be a structurally  normal fetal heart and rhythm. The patient was made aware of  the limitations of the fetal heart study: malformations such as but not limiting to minor valve , VSD, anomalous pulmonary venus return, or coarctation of the aorta maybe missed. I discussed the results with the patient.     Twin B - IUP in cephalic presentation.   Placenta is anterior.  Cardiac activity is present, 137 bpm  MVP is 4.1 cm.     Twin B: A transabdominal 2D Doppler, fetal echocardiogram is performed. There is a four-chamber heart of appropriate cardiac dimensions. There is situs solitus with levocardia. Intracardiac connection appear to be normal.  The  arterioventricular valves appear normal. There is no evidence of pericardial or pleural effusion. The A-V conduction is one to one and the heart rate is appropriate for gestational age. No evidence of fetal arrhythmias is seen during today's study. There is a right to left shunting across the patent mickie ovale. There is a normal appearance of the aorta and branching pattern of the head vessels.    Normal fetal Doppler interrogations    There appears to be a structurally  normal fetal heart and rhythm. The patient was made aware of the limitations of the fetal heart study: malformations such as but not limiting to minor valve , VSD, anomalous pulmonary venus return, or coarctation of the aorta maybe missed. I discussed the results with the patient.    See PACS/Imaging Tab For Complete Ultrasound Report  I interpreted the results and reviewed them with the patient.    DISCUSSION  During her visit we discussed and reviewed the following issues:  ADVANCED MATERNAL AGE  See prior [facility] notes for a detailed review.  She did not desire invasive genetic testing.   She has already obtained a low-risk NPIT result and was appropriately reassured.     IVF GESTATION  See prior [facility] notes for a detailed review.    DICHORIONIC DIAMNIOTIC TWIN GESTATION - follow-up  Appropriate interval growth is noted.  The patient denies any signs or symptoms of  labor. There  is no clinical evidence of preeclampsia.  See follow-up recommendations below.    Reasons to go to L&D discussed.  IMPRESSION:  IUP at 24w1d   Dichorionic Diamniotic Twin Gestation  AMA:low-risk free fetal DNA  , declined invasive testing -however oocyte from 23-year-old and patient reports low risk PGT  IVF Gestation  Gestational Carrier    RECOMMENDATIONS:  Continue care with Dr. Jimenez  Monthly growth ultrasounds in the third trimester  Weekly NST's at 32 weeks.  Monitor for signs or symptoms of  labor, preeclampsia or fetal growth disturbances.  In the absence of other maternal or fetal indications, delivery advised at 38 weeks gestation.    Thank you for allowing me to participate in the care of your patient.  Please do not hesitate to contact me if additional questions or concerns arise.      Jodi Ibarra MD     15 minutes spent in review of records, patient consultation, documentation and coordination of care.  The relevant clinical matter(s) are summarized above.     Note to patient and family  The 21st Century Cures Act makes medical notes available to patients in the interest of transparency.  However, please be advised that this is a medical document.  It is intended as nnov-in-ivpu communication.  It is written and medical language may contain abbreviations or verbiage that are technical and unfamiliar.  It may appear blunt or direct.  Medical documents are intended to carry relevant information, facts as evident, and the clinical opinion of the practitioner.

## 2025-01-23 ENCOUNTER — LAB ENCOUNTER (OUTPATIENT)
Dept: LAB | Age: 35
End: 2025-01-23
Attending: OBSTETRICS & GYNECOLOGY
Payer: COMMERCIAL

## 2025-01-23 DIAGNOSIS — O26.899 RH NEGATIVE STATE IN ANTEPARTUM PERIOD (HCC): ICD-10-CM

## 2025-01-23 DIAGNOSIS — O99.810 ABNORMAL GLUCOSE TOLERANCE TEST IN PREGNANCY (HCC): Primary | ICD-10-CM

## 2025-01-23 DIAGNOSIS — Z67.91 RH NEGATIVE STATE IN ANTEPARTUM PERIOD (HCC): ICD-10-CM

## 2025-01-23 DIAGNOSIS — Z36.9 ANTENATAL SCREENING ENCOUNTER (HCC): ICD-10-CM

## 2025-01-23 LAB
ANTIBODY SCREEN: NEGATIVE
BASOPHILS # BLD AUTO: 0.07 X10(3) UL (ref 0–0.2)
BASOPHILS NFR BLD AUTO: 0.7 %
EOSINOPHIL # BLD AUTO: 0.13 X10(3) UL (ref 0–0.7)
EOSINOPHIL NFR BLD AUTO: 1.3 %
ERYTHROCYTE [DISTWIDTH] IN BLOOD BY AUTOMATED COUNT: 13 %
GLUCOSE 1H P GLC SERPL-MCNC: 147 MG/DL
HCT VFR BLD AUTO: 34.8 %
HGB BLD-MCNC: 11.2 G/DL
IMM GRANULOCYTES # BLD AUTO: 0.19 X10(3) UL (ref 0–1)
IMM GRANULOCYTES NFR BLD: 1.9 %
LYMPHOCYTES # BLD AUTO: 0.99 X10(3) UL (ref 1–4)
LYMPHOCYTES NFR BLD AUTO: 10.1 %
MCH RBC QN AUTO: 28.8 PG (ref 26–34)
MCHC RBC AUTO-ENTMCNC: 32.2 G/DL (ref 31–37)
MCV RBC AUTO: 89.5 FL
MONOCYTES # BLD AUTO: 0.78 X10(3) UL (ref 0.1–1)
MONOCYTES NFR BLD AUTO: 8 %
NEUTROPHILS # BLD AUTO: 7.63 X10 (3) UL (ref 1.5–7.7)
NEUTROPHILS # BLD AUTO: 7.63 X10(3) UL (ref 1.5–7.7)
NEUTROPHILS NFR BLD AUTO: 78 %
PLATELET # BLD AUTO: 360 10(3)UL (ref 150–450)
RBC # BLD AUTO: 3.89 X10(6)UL
WBC # BLD AUTO: 9.8 X10(3) UL (ref 4–11)

## 2025-01-23 PROCEDURE — 36415 COLL VENOUS BLD VENIPUNCTURE: CPT

## 2025-01-23 PROCEDURE — 86850 RBC ANTIBODY SCREEN: CPT

## 2025-01-23 PROCEDURE — 82950 GLUCOSE TEST: CPT

## 2025-01-23 PROCEDURE — 85025 COMPLETE CBC W/AUTO DIFF WBC: CPT

## 2025-01-23 NOTE — PROGRESS NOTES
Abnormal 1 hr glucose tolerance test.  Check 3 hr GTT.  Order placed.  Patient notified via [employer].

## 2025-02-07 ENCOUNTER — LAB ENCOUNTER (OUTPATIENT)
Dept: LAB | Age: 35
End: 2025-02-07
Attending: OBSTETRICS & GYNECOLOGY
Payer: COMMERCIAL

## 2025-02-07 DIAGNOSIS — O99.810 ABNORMAL GLUCOSE TOLERANCE TEST IN PREGNANCY (HCC): ICD-10-CM

## 2025-02-07 LAB
GLUCOSE 1H P GLC SERPL-MCNC: 133 MG/DL
GLUCOSE 2H P GLC SERPL-MCNC: 122 MG/DL
GLUCOSE 3H P GLC SERPL-MCNC: 128 MG/DL (ref 70–140)
GLUCOSE P FAST SERPL-MCNC: 75 MG/DL

## 2025-02-07 PROCEDURE — 82952 GTT-ADDED SAMPLES: CPT | Performed by: OBSTETRICS & GYNECOLOGY

## 2025-02-07 PROCEDURE — 82951 GLUCOSE TOLERANCE TEST (GTT): CPT | Performed by: OBSTETRICS & GYNECOLOGY

## 2025-02-19 NOTE — PROGRESS NOTES
Outpatient Maternal-Fetal Medicine Follow-Up     Dear Dr. Jimenez     Thank you for requesting ultrasound evaluation and maternal fetal medicine consultation on your patient Krista Collado.  As you are aware she is a 34 year old female  with a twin pregnancy and an Estimated Date of Delivery: 25.  She returned to maternal-fetal medicine today for a follow-up visit.  Her history was reviewed from her prior visit and there were no interval changes.     Antepartum Risk Factors  Dichorionic Diamniotic Twin Gestation  AMA:low-risk free fetal DNA  , declined invasive testing -however oocyte from 23-year-old and patient reports low risk PGT  IVF Gestation  Gestational Carrier     PHYSICAL EXAMINATION:  /78 (BP Location: Right arm, Patient Position: Sitting, Cuff Size: adult)   Pulse 99   Ht 5' 4\" (1.626 m)   Wt 203 lb (92.1 kg)   LMP 2024 (Approximate)   BMI 34.84 kg/m²   General: alert and oriented, no acute distress  Abdomen: gravid, soft, non-tender  Extremities: non-tender, no edema     OBSTETRIC ULTRASOUND     Ultrasound findings:   Twin A - IUP in breech presentation.    Placenta is anterior.   Cardiac activity is present 124 bpm  EFW 1128 g ( 2 lb 8 oz); 43%.   MVP is 7.5 cm.     TWIN A: The fetal measurements are consistent with established EDC. No gross ultrasound evidence of structural abnormalities are seen today. The patient understands that ultrasound cannot rule out all structural and chromosomal abnormalities.    Twin B - IUP in cephalic presentation.   Placenta is anterior.  Cardiac activity is present 140 bpm  EFW 1069 g (2 lb 6 oz); 36%.   MVP is 5.2 cm.     TWIN B: The fetal measurements are consistent with established EDC. No gross ultrasound evidence of structural abnormalities are seen today. The patient understands that ultrasound cannot rule out all structural and chromosomal abnormalities.    Discordance - 5.2 %     See PACS/Imaging Tab For Complete Ultrasound  Report  I interpreted the results and reviewed them with the patient.     DISCUSSION  During her visit we discussed and reviewed the following issues:  ADVANCED MATERNAL AGE  See prior MFM notes for a detailed review.  She did not desire invasive genetic testing.   She has already obtained a low-risk NPIT result and was appropriately reassured.      IVF GESTATION  See prior MFM notes for a detailed review.     DICHORIONIC DIAMNIOTIC TWIN GESTATION - follow-up  Appropriate interval growth is noted.  The patient denies any signs or symptoms of  labor. There is no clinical evidence of preeclampsia.  See follow-up recommendations below.     IMPRESSION:  IUP at 28w2d  Dichorionic Diamniotic Twin Gestation  AMA:low-risk free fetal DNA  , declined invasive testing -however oocyte from 23-year-old and patient reports low risk PGT  IVF Gestation  Gestational Carrier     RECOMMENDATIONS:  Continue care with Dr. Jimenez  Fetal echocardiogram at 24 weeks  Monthly growth ultrasounds in the third trimester  Weekly NST's at 32 weeks.  Monitor for signs or symptoms of  labor, preeclampsia or fetal growth disturbances.  In the absence of other maternal or fetal indications, delivery advised at 38 weeks gestation.     Thank you for allowing me to participate in the care of your patient.  Please do not hesitate to contact me if additional questions or concerns arise.       Malcom Acosta M.D.     40 minutes spent in review of records, patient consultation, documentation and coordination of care.  The relevant clinical matter(s) are summarized above.      Note to patient and family  The 21st Century Cures Act makes medical notes available to patients in the interest of transparency.  However, please be advised that this is a medical document.  It is intended as jdfz-ag-hylu communication.  It is written and medical language may contain abbreviations or verbiage that are technical and unfamiliar.  It may appear blunt  or direct.  Medical documents are intended to carry relevant information, facts as evident, and the clinical opinion of the practitioner.

## 2025-02-20 ENCOUNTER — ROUTINE PRENATAL (OUTPATIENT)
Dept: OBGYN CLINIC | Facility: CLINIC | Age: 35
End: 2025-02-20
Payer: COMMERCIAL

## 2025-02-20 ENCOUNTER — ULTRASOUND ENCOUNTER (OUTPATIENT)
Dept: PERINATAL CARE | Facility: HOSPITAL | Age: 35
End: 2025-02-20
Attending: OBSTETRICS & GYNECOLOGY
Payer: COMMERCIAL

## 2025-02-20 VITALS
DIASTOLIC BLOOD PRESSURE: 78 MMHG | HEIGHT: 64 IN | BODY MASS INDEX: 34.66 KG/M2 | WEIGHT: 203 LBS | HEART RATE: 99 BPM | SYSTOLIC BLOOD PRESSURE: 109 MMHG

## 2025-02-20 VITALS
BODY MASS INDEX: 34.83 KG/M2 | DIASTOLIC BLOOD PRESSURE: 76 MMHG | WEIGHT: 204 LBS | SYSTOLIC BLOOD PRESSURE: 122 MMHG | HEIGHT: 64 IN | HEART RATE: 96 BPM

## 2025-02-20 DIAGNOSIS — O09.529 AMA (ADVANCED MATERNAL AGE) MULTIGRAVIDA 35+ (HCC): ICD-10-CM

## 2025-02-20 DIAGNOSIS — O30.049 DICHORIONIC DIAMNIOTIC TWIN PREGNANCY, ANTEPARTUM (HCC): Primary | ICD-10-CM

## 2025-02-20 DIAGNOSIS — Z34.90 PRENATAL CARE, ANTEPARTUM (HCC): Primary | ICD-10-CM

## 2025-02-20 DIAGNOSIS — O30.049 DICHORIONIC DIAMNIOTIC TWIN PREGNANCY, ANTEPARTUM (HCC): ICD-10-CM

## 2025-02-20 DIAGNOSIS — Z36.9 ANTENATAL SCREENING ENCOUNTER (HCC): ICD-10-CM

## 2025-02-20 DIAGNOSIS — O26.899 RH NEGATIVE STATE IN ANTEPARTUM PERIOD (HCC): ICD-10-CM

## 2025-02-20 DIAGNOSIS — Z33.3 SURROGATE PREGNANCY (HCC): ICD-10-CM

## 2025-02-20 DIAGNOSIS — Z67.91 RH NEGATIVE STATE IN ANTEPARTUM PERIOD (HCC): ICD-10-CM

## 2025-02-20 DIAGNOSIS — O09.523 MULTIGRAVIDA OF ADVANCED MATERNAL AGE IN THIRD TRIMESTER (HCC): ICD-10-CM

## 2025-02-20 PROCEDURE — 76816 OB US FOLLOW-UP PER FETUS: CPT | Performed by: OBSTETRICS & GYNECOLOGY

## 2025-02-20 PROCEDURE — 3074F SYST BP LT 130 MM HG: CPT | Performed by: OBSTETRICS & GYNECOLOGY

## 2025-02-20 PROCEDURE — 3078F DIAST BP <80 MM HG: CPT | Performed by: OBSTETRICS & GYNECOLOGY

## 2025-02-20 PROCEDURE — 96372 THER/PROPH/DIAG INJ SC/IM: CPT | Performed by: OBSTETRICS & GYNECOLOGY

## 2025-02-20 PROCEDURE — 3008F BODY MASS INDEX DOCD: CPT | Performed by: OBSTETRICS & GYNECOLOGY

## 2025-02-20 NOTE — PATIENT INSTRUCTIONS
LABOR & DELIVERY PRE-REGISTRATION    Thank you for choosing [facility] for you labor and delivery needs.  We look forward to caring for you and your family in this exciting time.  In order to better care for all of our patients, [facility] recommends that you complete your delivery registration as early in your pregnancy as possible.  Registering for your delivery in advance saves you the time of doing so on your day of delivery and allows your care team to be better prepared for your delivery date.  For more information about Labor and Delivery at [facility] and to pre-register, visit [email or web address]--> Search Our Services-->Pregnancy & Baby.    TWO WAYS TO REGISTER ONLINE    Epic MyChart allows access to multiple medical organizations, including [facility] and other medical organizations that use the Epic electronic health record system.  To add [facility] or any other medical organization, just tap My Organizations at the top left corner of the login page in the combionic rahul, and then click Add Organization.  [place] in combionic- Pre-register for your hospital stay through your combionic account.  After logging into combionic, click on Visits.  Under Visits, click on [place] for Delivery and follow the directions to register.  You may print the confirmation page.  If you do not already have a combionic account, ask our office for an Access Code.  Go to [email or web address] and follow the prompts to set up your account.  [place] on [email or web address]- Pre-register for your hospital stay through the convenient online form on our website.  Go to [email or web address].  Scroll down the page and click on [facility].  Fill out all of the required information and click submit.  You will receive a confirmation of your submission.  Questions about registering for your hospital stay?  Contact the [facility]-Labor & Delivery at [phone].      PRENATAL CLASSES    Both in-person and  virtual classes are available.  Weeknight and weekend classes are available.  Go to [email or web address]   or   [email or web address]  Click on drop down menu on the right side  Scroll down to \"Find a Class or Support Group\"  Scroll down and click on \"[facility]\"  Type any of the following in the Search Bar  - Babycare  - Virtual Tour- for the [facility]  - Natural Childbirth (even if you are planning on having an epidural).  Topics include labor and birth, breathing techniques, epidurals, postpartum issues.  - Virtual Childbirth Express  - Breastfeeding       FETAL MOVEMENT CHART    Although not all women need to perform daily fetal movement counts, if you notice a decrease in fetal activity, you should contact our office immediately.      Begin counting fetal movements at 32 weeks of pregnancy.  You may find that your baby is more active after eating or drinking.       We want you to time how long it takes to feel 10 movements (kicks, flutters, swishes or rolls).  You should feel at least 10 movements in 2 hours.  You will likely feel 10 movements in less than 2 hours.    If you have concerns, you can use the attached table to record movements.  Record the time you feel the first movement and the tenth movement.  This will help you observe patterns and discover how long it normally takes your baby to move 10 times.       Please call us if any of the following occurs:  You have not felt the baby move for a couple of hours  It is taking longer each day to get the tenth movement    The main point is we want you to know the characteristics of your baby, so you can tell the doctor or nurse if something different is happening.    Again, if you notice a decrease in fetal movement, please give the office a call.    If our office is closed, the answering service will page the doctor on-call.    ------------------------------      Time M T W Th F S S                                                                                                                  Time M T W Th F S S                                                                                                           Time M T W Th F S S                                                                                                           Time M T W Th F S S                                                                                                         VACCINE RECOMMENDATIONS     Pertussis- [place] has significant outbreaks of pertussis (whooping cough) every year.  Therefore, the CDC recommends that all pregnant women receive a Tdap vaccine during pregnancy, regardless of whether you have received one previously.  The vaccine reduces your chances of grace the illness, and also provides some natural immunity to your baby for possible exposures after birth.  The best time to get the vaccine is between 27 and 36 weeks.  Baby caregivers (grandparents, spouse) should also make sure they are up to date on the vaccine (within the last 10 years).     Influenza- Pregnant women who develop the flu are more prone to serious complications that can affect both mother and baby.  The CDC recommends that all women who will be pregnant during flu season (October to May) receive the flu vaccine (injection only, not nasal spray).  The vaccine can be given during any stage of pregnancy.     Both vaccines are offered in our office, as well as through many pharmacies and primary care offices.    Covid-19 Vaccine   If you are pregnant or were recently pregnant, you are more likely to get very sick from COVID-19 than people who are not pregnant. Additionally, if you have COVID-19 during pregnancy, you are more likely to have complications that can affect your pregnancy and developing baby.  Please check the CDC website for the most up-to-date recommendations on Covid vaccination at www.cdc.gov/coronavirus/vaccine    COVID-19  vaccination is recommended for everyone ages 6 months and older, including people who are pregnant, breastfeeding, trying to get pregnant now, or who might become pregnant in the future. Evidence shows that COVID-19 vaccination before and during pregnancy is safe and effective and suggests that the benefits of vaccination outweigh any known or potential risks. New data show that vaccination during pregnancy can help protect babies younger than 6 months old, when they are too young to be vaccinated themselves, from hospitalization due to COVID-19.    RSV Vaccine  RSV (Respiratory Syncytial Virus) is a common respiratory virus that causes cold like symptoms in adults and babies.  Infants and adults over 60 years old are more likely to develop severe RSV infection requiring hospitalization.  A new RSV vaccine was released in October 2023 that if given during the third trimester of pregnancy, reduces your baby's risk of being hospitalized with RSV after birth by up to 80%.  Therefore the CDC recommends that pregnant moms receive one dose of the RSV vaccine Pfizer Abrysvo sometime between 32 and 36 weeks of pregnancy, before or during RSV season (September through January).  If you decide not to get the vaccine, you can talk to your pediatrician about your baby receiving the RSV antibody injection Beyfortus after birth.

## 2025-02-20 NOTE — PROGRESS NOTES
Chief Complaint   Patient presents with    Prenatal Care     SHILPA     Routine prenatal visit.  Patient without complaints.  Good fetal movement  Patient denies any bleeding, leaking fluid, cramping, or contractions.    Assessment/Plan:  28w2d doing well  1 hour GTT, CBC done.  HIV and RPR ordered.  Blood type O neg- Rhogam today  Reviewed vaccine recommendations:  Tdap declined  Twins/AMA- NST starting at 32 wks.  Kick count information given.  Reviewed  labor signs and symptoms.    Diagnoses and all orders for this visit:    Prenatal care, antepartum (HCC)     screening encounter ([facility])  -     HIV Ag/Ab Combo; Future  -     T Pallidum Screening [place]; Future    Rh negative state in antepartum period ([facility])  -     Rho D immune globulin (RhoGAM) IM injection 300 mcg      Return in about 2 weeks (around 3/6/2025) for Routine Prenatal Visit, NST 32 wks.

## 2025-03-05 ENCOUNTER — TELEPHONE (OUTPATIENT)
Dept: OBGYN CLINIC | Facility: CLINIC | Age: 35
End: 2025-03-05

## 2025-03-06 ENCOUNTER — LAB ENCOUNTER (OUTPATIENT)
Dept: LAB | Age: 35
End: 2025-03-06
Attending: OBSTETRICS & GYNECOLOGY
Payer: COMMERCIAL

## 2025-03-06 ENCOUNTER — ROUTINE PRENATAL (OUTPATIENT)
Dept: OBGYN CLINIC | Facility: CLINIC | Age: 35
End: 2025-03-06
Payer: COMMERCIAL

## 2025-03-06 VITALS
DIASTOLIC BLOOD PRESSURE: 72 MMHG | WEIGHT: 205.13 LBS | HEART RATE: 88 BPM | BODY MASS INDEX: 35.02 KG/M2 | HEIGHT: 64 IN | SYSTOLIC BLOOD PRESSURE: 124 MMHG

## 2025-03-06 DIAGNOSIS — O30.049 DICHORIONIC DIAMNIOTIC TWIN PREGNANCY, ANTEPARTUM (HCC): ICD-10-CM

## 2025-03-06 DIAGNOSIS — O09.522 AMA (ADVANCED MATERNAL AGE) MULTIGRAVIDA 35+, SECOND TRIMESTER (HCC): Primary | ICD-10-CM

## 2025-03-06 DIAGNOSIS — Z36.9 ANTENATAL SCREENING ENCOUNTER (HCC): ICD-10-CM

## 2025-03-06 LAB — T PALLIDUM AB SER QL IA: NONREACTIVE

## 2025-03-06 PROCEDURE — 3078F DIAST BP <80 MM HG: CPT | Performed by: NURSE PRACTITIONER

## 2025-03-06 PROCEDURE — 36415 COLL VENOUS BLD VENIPUNCTURE: CPT

## 2025-03-06 PROCEDURE — 3008F BODY MASS INDEX DOCD: CPT | Performed by: NURSE PRACTITIONER

## 2025-03-06 PROCEDURE — 87389 HIV-1 AG W/HIV-1&-2 AB AG IA: CPT

## 2025-03-06 PROCEDURE — 3074F SYST BP LT 130 MM HG: CPT | Performed by: NURSE PRACTITIONER

## 2025-03-06 PROCEDURE — 86780 TREPONEMA PALLIDUM: CPT

## 2025-03-06 RX ORDER — OMEPRAZOLE 20 MG/1
20 CAPSULE, DELAYED RELEASE ORAL
COMMUNITY

## 2025-03-06 NOTE — PROGRESS NOTES
SHILPA  Doing well, no concerns or questions  GOOD FM x 2  /154  Denies VB/LOF/uctx  Reviewed signs of  labor and pre- eclampsia which she denies any at this time  RTC in 2 wks/ NST to start weekly  Fetal movement discussed

## 2025-03-12 NOTE — PROGRESS NOTES
Outpatient Maternal-Fetal Medicine Follow-Up     Dear Dr. Jimenez     Thank you for requesting ultrasound evaluation and maternal fetal medicine consultation on your patient Krista Collaod.  As you are aware she is a 34 year old female  with a twin pregnancy and an Estimated Date of Delivery: 25.  She returned to maternal-fetal medicine today for a follow-up visit.  Her history was reviewed from her prior visit and there were no interval changes.     Antepartum Risk Factors  Dichorionic Diamniotic Twin Gestation  AMA:low-risk free fetal DNA  , declined invasive testing -however oocyte from 23-year-old and patient reports low risk PGT  IVF Gestation  Gestational Carrier    S:  No regular contractions or concerns.  Feels FM  She passed the 3 hour GTT.     PHYSICAL EXAMINATION:  /87 (BP Location: Right arm, Patient Position: Sitting, Cuff Size: adult)   Pulse 99   Ht 5' 4\" (1.626 m)   Wt 210 lb (95.3 kg)   LMP 2024 (Approximate)   BMI 36.05 kg/m²   General: alert and oriented, no acute distress  Abdomen: gravid, soft, non-tender  Extremities: non-tender, no edema     OBSTETRIC ULTRASOUND     Ultrasound findings:   Twin A - IUP in cephalic,left presentation.    Placenta is anterior.   Cardiac activity is present, 124 bpm  EFW 1739 g ( 3 lb 13 oz); 29%.   MVP is 7.6 cm. BPP is 8/8.  TWIN A: The fetal measurements are consistent with established EDC. No gross ultrasound evidence of structural abnormalities are seen today. The patient understands that ultrasound cannot rule out all structural and chromosomal abnormalities.    Twin B - IUP in cephalic, right and presenting presentation.   Placenta is anterior.   Cardiac activity is present, 127 bpm  EFW 1964 g (4 lb 5 oz); 46%.   MVP is 4.4 cm. BPP is 8/8.  TWIN B: The fetal measurements are consistent with established EDC. No gross ultrasound evidence of structural abnormalities are seen today. The patient understands that ultrasound cannot  rule out all structural and chromosomal abnormalities.    Discordance - 11.5 %     See PACS/Imaging Tab For Complete Ultrasound Report  I interpreted the results and reviewed them with the patient.     DISCUSSION  During her visit we discussed and reviewed the following issues:  ADVANCED MATERNAL AGE  See prior MFM notes for a detailed review.  She did not desire invasive genetic testing.   She has already obtained a low-risk NPIT result and was appropriately reassured.      IVF GESTATION  See prior MFM notes for a detailed review.     DICHORIONIC DIAMNIOTIC TWIN GESTATION - follow-up  Appropriate interval growth is noted.  The patient denies any signs or symptoms of  labor. There is no clinical evidence of preeclampsia.  See follow-up recommendations below.     IMPRESSION:  IUP at 32w3d  Dichorionic Diamniotic Twin Gestation  Twin A - cephalic left; normal fetal growth and  testing  Twin B - Presenting baby, cephalic right; normal fetal growth and  testing  AMA:low-risk free fetal DNA  , declined invasive testing -however oocyte from 23-year-old and patient reports low risk PGT  IVF Gestation  Gestational Carrier     RECOMMENDATIONS:  Continue care with Dr. Jimenez  Monthly growth & BPP ultrasound  Weekly NST's   Monitor for signs or symptoms of  labor, preeclampsia or fetal growth disturbances.  In the absence of other maternal or fetal indications, delivery advised at 38 weeks gestation.     Thank you for allowing me to participate in the care of your patient.  Please do not hesitate to contact me if additional questions or concerns arise.       Raine Vanegas M.D.     30 minutes spent in review of records, patient consultation, documentation and coordination of care.  The relevant clinical matter(s) are summarized above.      Note to patient and family  The 21st Century Cures Act makes medical notes available to patients in the interest of transparency.  However, please be advised  that this is a medical document.  It is intended as jppw-vn-weus communication.  It is written and medical language may contain abbreviations or verbiage that are technical and unfamiliar.  It may appear blunt or direct.  Medical documents are intended to carry relevant information, facts as evident, and the clinical opinion of the practitioner.

## 2025-03-21 ENCOUNTER — ULTRASOUND ENCOUNTER (OUTPATIENT)
Dept: PERINATAL CARE | Facility: HOSPITAL | Age: 35
End: 2025-03-21
Attending: OBSTETRICS & GYNECOLOGY
Payer: COMMERCIAL

## 2025-03-21 ENCOUNTER — ROUTINE PRENATAL (OUTPATIENT)
Dept: OBGYN CLINIC | Facility: CLINIC | Age: 35
End: 2025-03-21
Payer: COMMERCIAL

## 2025-03-21 ENCOUNTER — TELEPHONE (OUTPATIENT)
Dept: OBGYN CLINIC | Facility: CLINIC | Age: 35
End: 2025-03-21

## 2025-03-21 VITALS
WEIGHT: 210 LBS | SYSTOLIC BLOOD PRESSURE: 119 MMHG | DIASTOLIC BLOOD PRESSURE: 87 MMHG | HEIGHT: 64 IN | BODY MASS INDEX: 35.85 KG/M2 | HEART RATE: 87 BPM

## 2025-03-21 VITALS
SYSTOLIC BLOOD PRESSURE: 119 MMHG | HEIGHT: 64 IN | HEART RATE: 99 BPM | WEIGHT: 210 LBS | DIASTOLIC BLOOD PRESSURE: 87 MMHG | BODY MASS INDEX: 35.85 KG/M2

## 2025-03-21 DIAGNOSIS — O09.529 AMA (ADVANCED MATERNAL AGE) MULTIGRAVIDA 35+ (HCC): ICD-10-CM

## 2025-03-21 DIAGNOSIS — Z33.3 SURROGATE PREGNANCY (HCC): Primary | ICD-10-CM

## 2025-03-21 DIAGNOSIS — Z33.3 SURROGATE PREGNANCY (HCC): ICD-10-CM

## 2025-03-21 DIAGNOSIS — Z3A.32 32 WEEKS GESTATION OF PREGNANCY (HCC): Primary | ICD-10-CM

## 2025-03-21 DIAGNOSIS — O09.523 MULTIGRAVIDA OF ADVANCED MATERNAL AGE IN THIRD TRIMESTER (HCC): ICD-10-CM

## 2025-03-21 DIAGNOSIS — O30.049 DICHORIONIC DIAMNIOTIC TWIN PREGNANCY, ANTEPARTUM (HCC): ICD-10-CM

## 2025-03-21 PROCEDURE — 76819 FETAL BIOPHYS PROFIL W/O NST: CPT

## 2025-03-21 PROCEDURE — 76816 OB US FOLLOW-UP PER FETUS: CPT | Performed by: OBSTETRICS & GYNECOLOGY

## 2025-03-21 PROCEDURE — 76816 OB US FOLLOW-UP PER FETUS: CPT

## 2025-03-21 PROCEDURE — 3074F SYST BP LT 130 MM HG: CPT | Performed by: STUDENT IN AN ORGANIZED HEALTH CARE EDUCATION/TRAINING PROGRAM

## 2025-03-21 PROCEDURE — 3079F DIAST BP 80-89 MM HG: CPT | Performed by: STUDENT IN AN ORGANIZED HEALTH CARE EDUCATION/TRAINING PROGRAM

## 2025-03-21 PROCEDURE — 3008F BODY MASS INDEX DOCD: CPT | Performed by: STUDENT IN AN ORGANIZED HEALTH CARE EDUCATION/TRAINING PROGRAM

## 2025-03-21 NOTE — PROGRESS NOTES
Return OB Visit 28-33 WGA      GA: 32w3d  Vitals:    25 1107   BP: 119/87   Pulse: 87   Weight: 210 lb (95.3 kg)   Height: 64\"       Doing well, +FM x2  Denies LOF/VB/uctx  Rh negative s/p rhogam, TDAP declined, EPDS Depression Scale Total: 0 (2025 12:03 PM)   PTL and Fetal movement instructions given  3rd T HIV/RPR NR      Patient Active Problem List    Diagnosis    Abnormal glucose tolerance test in pregnancy ([facility])     [ x ] Check 3 hr GTT- normal      Rh negative state in antepartum period ([facility])     [ x ] Will need second trimester antibody screen and Rhogam- done 25      Surrogate pregnancy ([facility])     IVF pregnancy with frozen embryo x 2. Parents live in [place].   [ ] medical records     TDAP DECLINED      Pregnancy resulting from in vitro fertilization, antepartum ([facility])     [X] MFM      AMA (advanced maternal age) multigravida 35+, second trimester ([facility])     Low dose ASA  [X] MFM      Obesity affecting pregnancy, antepartum ([facility])     Low dose ASA      Dichorionic diamniotic twin pregnancy, antepartum ([facility])     [x] s/p MFM consult 10/31  Normal baseline PIH labs  MFM Recommendations:  Fetal echocardiogram at 24 weeks  Monthly growth ultrasounds in the third trimester:  32 WGA Twin A - IUP in cephalic,left presentation, Placenta is anterior. EFW 1739 g ( 3 lb 13 oz); 29%. MVP is 7.6 cm. BPP is 8/8.  Twin B - IUP in cephalic, right and presenting presentation. Placenta is anterior. EFW 1964 g (4 lb 5 oz); 46%.; Discordance - 11.5 %   Weekly NST's at 32 weeks.  Monitor for signs or symptoms of  labor, preeclampsia or fetal growth disturbances.  In the absence of other maternal or fetal indications, delivery advised at 38 weeks gestation.  Pt is desiring vaginal delivery. Discussed risks of baby B not being cephalic, in which case I discussed risks of ECV (fetal intolerance, placental abruption), internal podalic version, breech extraction (head entrapment, fractured clavicle/humerus,  nerve injury, hypoxia and fetal death) and risk of emergency . IOL request sent      Nausea and vomiting in pregnancy (HCC)    Major depressive disorder, single episode, severe without psychotic features (HCC)         RTC in 2 wks      Note to patient and family   The 21st Century Cures Act makes medical notes available to patients in the interest of transparency.  However, please be advised that this is a medical document.  It is intended as odfy-vt-duoi communication.  It is written and medical language may contain abbreviations or verbiage that are technical and unfamiliar.  It may appear blunt or direct.  Medical documents are intended to carry relevant information, facts as evident, and the clinical opinion of the practitioner.      Nilo Gutierrez MD

## 2025-03-21 NOTE — PATIENT INSTRUCTIONS
KICK COUNT INSTRUCTIONS    After 28 weeks of pregnancy, we would like for you to monitor your baby’s movement daily by doing kick counts, an easy way for you to assess your baby’s well-being.    How to count kicks:  Choose a time when the baby is active, such as after a meal.  Sit comfortably or lie on your side, and count the number of movements in an hour… you should feel 10 movements in 2 hours    The evening hours seem to be the most convenient time to do this test, although you can also do this test anytime you are concerned about the baby’s movement.    Call the office right away at [phone] if you notice any of the following:  Your baby moves less than 10 times in 2 hours while you are doing kick counts.  Your baby moves much less often than on the days before.  You have not felt your baby move all day.

## 2025-03-21 NOTE — TELEPHONE ENCOUNTER
----- Message from Nilo Gutierrez sent at 3/21/2025 12:48 PM CDT -----  Request IOL at/between this GA: 38 wks (pt desires 38 wks exactly)  Estimated Date of Delivery: 5/13/25  Indication for IOL:  twins   Time of appointment: PM  Cervical ripening with cytotec: yes  IOL with pitocin: yes, per protocol   OB history/complications: twins, AMA

## 2025-03-27 ENCOUNTER — ROUTINE PRENATAL (OUTPATIENT)
Dept: OBGYN CLINIC | Facility: CLINIC | Age: 35
End: 2025-03-27
Payer: COMMERCIAL

## 2025-03-27 ENCOUNTER — APPOINTMENT (OUTPATIENT)
Dept: OBGYN CLINIC | Facility: CLINIC | Age: 35
End: 2025-03-27
Payer: COMMERCIAL

## 2025-03-27 VITALS
WEIGHT: 212.81 LBS | BODY MASS INDEX: 37 KG/M2 | SYSTOLIC BLOOD PRESSURE: 118 MMHG | HEART RATE: 101 BPM | DIASTOLIC BLOOD PRESSURE: 76 MMHG

## 2025-03-27 DIAGNOSIS — O99.810 ABNORMAL GLUCOSE TOLERANCE TEST IN PREGNANCY (HCC): ICD-10-CM

## 2025-03-27 DIAGNOSIS — O21.9 NAUSEA AND VOMITING IN PREGNANCY (HCC): ICD-10-CM

## 2025-03-27 DIAGNOSIS — Z33.3 SURROGATE PREGNANCY (HCC): ICD-10-CM

## 2025-03-27 DIAGNOSIS — F32.2 MAJOR DEPRESSIVE DISORDER, SINGLE EPISODE, SEVERE WITHOUT PSYCHOTIC FEATURES (HCC): ICD-10-CM

## 2025-03-27 DIAGNOSIS — O30.049 DICHORIONIC DIAMNIOTIC TWIN PREGNANCY, ANTEPARTUM (HCC): ICD-10-CM

## 2025-03-27 DIAGNOSIS — O26.899 RH NEGATIVE STATE IN ANTEPARTUM PERIOD (HCC): ICD-10-CM

## 2025-03-27 DIAGNOSIS — Z67.91 RH NEGATIVE STATE IN ANTEPARTUM PERIOD (HCC): ICD-10-CM

## 2025-03-27 DIAGNOSIS — O09.90 SUPERVISION OF HIGH RISK PREGNANCY, ANTEPARTUM (HCC): Primary | ICD-10-CM

## 2025-03-27 DIAGNOSIS — O09.522 AMA (ADVANCED MATERNAL AGE) MULTIGRAVIDA 35+, SECOND TRIMESTER (HCC): ICD-10-CM

## 2025-03-27 DIAGNOSIS — O99.210 OBESITY AFFECTING PREGNANCY, ANTEPARTUM, UNSPECIFIED OBESITY TYPE (HCC): ICD-10-CM

## 2025-03-27 DIAGNOSIS — O09.819 PREGNANCY RESULTING FROM IN VITRO FERTILIZATION, ANTEPARTUM (HCC): ICD-10-CM

## 2025-03-27 PROCEDURE — 3078F DIAST BP <80 MM HG: CPT | Performed by: STUDENT IN AN ORGANIZED HEALTH CARE EDUCATION/TRAINING PROGRAM

## 2025-03-27 PROCEDURE — 59025 FETAL NON-STRESS TEST: CPT | Performed by: STUDENT IN AN ORGANIZED HEALTH CARE EDUCATION/TRAINING PROGRAM

## 2025-03-27 PROCEDURE — 3074F SYST BP LT 130 MM HG: CPT | Performed by: STUDENT IN AN ORGANIZED HEALTH CARE EDUCATION/TRAINING PROGRAM

## 2025-03-27 NOTE — PROGRESS NOTES
SHILPA    GA: 33w2d  Vitals:    25 0943   BP: 118/76   Pulse: 101   Weight: 212 lb 12.8 oz (96.5 kg)       Doing well, +FM  Denies LOF/VB/uctx  Rh negative s/p Rhogam, TDAP declined, Depression Scale Total: 0 (2025 12:03 PM)       Assessment:     Krista Collado is a 34 year old  at 33w2d who presents for routine OB visit. Overall doing well.     Problem List Items Addressed This Visit       Surrogate pregnancy ([facility])    Pregnancy resulting from in vitro fertilization, antepartum ([facility])    AMA (advanced maternal age) multigravida 35+, second trimester ([facility])    Relevant Orders    FETAL NON-STRESS TEST [facility] ONLY 10317 (Completed)    Obesity affecting pregnancy, antepartum ([facility])    Dichorionic diamniotic twin pregnancy, antepartum ([facility])    Relevant Orders    FETAL NON-STRESS TEST [facility] ONLY 16071 (Completed)    Nausea and vomiting in pregnancy ([facility])    Rh negative state in antepartum period ([facility])    Major depressive disorder, single episode, severe without psychotic features ([facility]) - Primary    Abnormal glucose tolerance test in pregnancy ([facility])           Plan:     Patient Active Problem List    Diagnosis    Abnormal glucose tolerance test in pregnancy ([facility])     [ x ] Check 3 hr GTT- normal      Rh negative state in antepartum period ([facility])     [ x ] Will need second trimester antibody screen and Rhogam- done 25      Surrogate pregnancy ([facility])     IVF pregnancy with frozen embryo x 2. Parents live in [place].   [ ] medical records     TDAP DECLINED      Pregnancy resulting from in vitro fertilization, antepartum ([facility])     [X] MFM      AMA (advanced maternal age) multigravida 35+, second trimester ([facility])     Low dose ASA  [X] MFM      Obesity affecting pregnancy, antepartum ([facility])     Low dose ASA      Dichorionic diamniotic twin pregnancy, antepartum ([facility])     [x] s/p MFM consult 10/31  Normal baseline PIH labs  MFM Recommendations:  Fetal echocardiogram at 24 weeks  Monthly growth ultrasounds in the third  trimester:  32 WGA Twin A - IUP in cephalic,left presentation, Placenta is anterior. EFW 1739 g ( 3 lb 13 oz); 29%. MVP is 7.6 cm. BPP is 8/8.  Twin B - IUP in cephalic, right and presenting presentation. Placenta is anterior. EFW 1964 g (4 lb 5 oz); 46%.; Discordance - 11.5 %   Weekly NST's at 32 weeks.  Monitor for signs or symptoms of  labor, preeclampsia or fetal growth disturbances.  In the absence of other maternal or fetal indications, delivery advised at 38 weeks gestation.  Pt is desiring vaginal delivery. Discussed risks of baby B not being cephalic, in which case I discussed risks of ECV (fetal intolerance, placental abruption), internal podalic version, breech extraction (head entrapment, fractured clavicle/humerus, nerve injury, hypoxia and fetal death) and risk of emergency . IOL request sent      Nausea and vomiting in pregnancy ([facility])    Major depressive disorder, single episode, severe without psychotic features ([facility])       - continue routine prenatal care   - labor and rupture of membrane precautions provided  - Fetal movement instructions given  - continue weekly NSTs; NST reactive and reassuring x2    Return to clinic in 2 weeks for SHILPA visit     Margret Jimenez, DO  [facility] - OBGYN    Note to patient and family   The 21st Century Cures Act makes medical notes available to patients in the interest of transparency.  However, please be advised that this is a medical document.  It is intended as [id number]-[id number] communication.  It is written and medical language may contain abbreviations or verbiage that are technical and unfamiliar.  It may appear blunt or direct.  Medical documents are intended to carry relevant information, facts as evident, and the clinical opinion of the practitioner.

## 2025-03-27 NOTE — PATIENT INSTRUCTIONS
Labor Instructions    How do I know if it’s true labor?  One of the most important aspects of any pregnancy is being able to recognize the onset of labor.  Unfortunately, on occasion it can be difficult or confusing, especially if you have had one or more children.  Each labor can begin in a different way even if you have had four or five children.    If this is your first child, it is very common to have labor for an average greater than 10 hours; however, there have been rare instances for labor to be two hours or less for a first time mother. It is more important for you to know if this is your second or third baby to realize that any labor after the first is usually shorter.  There is no way to tell how long or short the labor will be. Therefore, please call us if you are unsure labor has started.       Usually, during the last six weeks of pregnancy, Servando-Alvarado contractions or “false labor pains occur”.  False labor is generally not very painful though it is not always easy to tell.  You may feel contractions, cramps or uterine tightening somewhere between every 3-30 minutes but they will not continue to get stronger over time.  If you lie down, drink plenty of fluid or walk around, the contractions may go away.   False contractions are very common if you have been active on your feet for several hours.   Women frequently worry about being sent home from the hospital without having their baby (i.e. the labor stopped).  Actually, this is an unnecessary worry, for this is an infrequent occurrence.     True labor usually begins in one of two ways.  In most patients it begins with contractions of the uterus, which are irregular (but not always) in the beginning.  They are cramp-like in character and feel similar to menstrual cramps.  After a while, they become more regular, and they seem to last a little longer, and feel a little sharper.  These symptoms are very important-more important- than the timing of the  contractions.  Having regular (usually closer), longer lasting (35-70 seconds), and sharper (more painful) contractions are the common symptoms of actual labor.  The second way in which labor can begin which occurs in approximately 30% of all patients is the rupture of the bag of water.  This is a sudden gush of watery fluid, usually sufficient to run down your leg and onto the floor, or you may wet a large area of the bed if it happens at night.  There may also be tricking that is uncontrolled.  If you are unsure, please call the office.      When should I call?  When contractions are strong and every 3-5 minutes.  If you have a gush of water or you think you might be leaking fluid.  If you are bleeding heavily.  If your baby is not moving around at least every 1-2 hours.  If you are worried about something.  When you think you are ready to go to the hospital.    Who do I call?  Call the office at [phone].  If the office is closed, the answering service will send a message to the physician on call.  The on call physician will be available for emergency phone calls only.          Can I eat in labor?  It is good to eat a light meal at home before going to the hospital.  Eat foods like crackers, popsicles, soup and fruit.  Avoid foods that are difficult to digest like meat, a lot of dairy products and high fat foods.  DO NOT EAT if you know you are scheduled for a  ().      What will happen when I get to the hospital?  When you arrive at the hospital, you will be admitted and examined.   There are a few factors that will determine if you will be allowed to be up out of bed or if you would need to stay in bed.  The main factors are how well the baby is entering into the pelvis and if the bag of taveras is in intact or ruptured.  An intravenous (IV) solution will, with exceptions, be started.  This is extremely important especially for the baby.   Your  will be allowed in the room during your  labor. During the delivery, the nurses will inform you of the hospital policy and how many coaches are allowed.  You may desire pain medication or anesthesia for pain.  You probably discussed some aspects of pain medication with us during your prenatal care.  The various options may also have been discussed in Prenatal Classes.  We utilize IV narcotics and epidural anesthesia when our patients request to have them.  If you chose to have no anesthesia, none will be administered.  A local anesthetic may be used at the time of delivery      What should I to bring to the hospital?  Maternity clothes to go home in  You can bring your own night gown to wear after giving birth, but most women prefer to wear the hospital gown because it may get soiled  Nursing Bra if you are planning to breastfeed  Clothes for your baby to go home in  Baby Car Seat.  Be sure you know how to install it correctly. Please install it before going to the hospital  Routine toiletries like toothbrush, shampoo, hairbrush and etc.   You can bring your favorite pillow, but please put a colored pillow case on it so it doesn’t get mixed up with hospital pillows    How long will I stay in the hospital?  The date you leave the hospital may vary depending on the speed of your recovery.  If you have a vaginal delivery, you will stay in the hospital 24-48 hours after your delivery as long as you aren’t having any complications.    If you have had a , you will stay in the hospital 48-72 hours as long as you aren’t having any complications.       Going Home Instructions  There are no set rules as to what you may do each day or week after you are home.  You will receive discharge instructions to help you each day.  Remember, early ambulation in the hospital is to prevent complications.  Do not let this lull you into a false sense of strength or ability to do certain physical acts which may tire you excessively.  Please call the office within a few days  after you are discharged from the hospital to schedule your post-partum visit, which is usually 4-6 weeks after delivery.    Any medications necessary will be discussed on an individual basis.  If you decide to breastfeed your baby, you should continue your prenatal vitamins.  If you do not breastfeed, simply finish the prenatal vitamins you have.      The staff at [facility] wish you a joyous and exciting birth.  If there is anything we can do to make this a better experience for you please do not hesitate to ask.

## 2025-04-03 ENCOUNTER — TELEPHONE (OUTPATIENT)
Dept: OBGYN CLINIC | Facility: CLINIC | Age: 35
End: 2025-04-03

## 2025-04-03 ENCOUNTER — ROUTINE PRENATAL (OUTPATIENT)
Dept: OBGYN CLINIC | Facility: CLINIC | Age: 35
End: 2025-04-03
Payer: COMMERCIAL

## 2025-04-03 ENCOUNTER — APPOINTMENT (OUTPATIENT)
Dept: OBGYN CLINIC | Facility: CLINIC | Age: 35
End: 2025-04-03
Payer: COMMERCIAL

## 2025-04-03 VITALS
SYSTOLIC BLOOD PRESSURE: 120 MMHG | HEIGHT: 64 IN | BODY MASS INDEX: 36.37 KG/M2 | HEART RATE: 90 BPM | DIASTOLIC BLOOD PRESSURE: 76 MMHG | WEIGHT: 213 LBS

## 2025-04-03 DIAGNOSIS — O09.819 PREGNANCY RESULTING FROM IN VITRO FERTILIZATION, ANTEPARTUM (HCC): ICD-10-CM

## 2025-04-03 DIAGNOSIS — O09.529 ANTEPARTUM MULTIGRAVIDA OF ADVANCED MATERNAL AGE (HCC): ICD-10-CM

## 2025-04-03 DIAGNOSIS — Z23 NEED FOR VACCINATION: ICD-10-CM

## 2025-04-03 DIAGNOSIS — Z33.3 SURROGATE PREGNANCY (HCC): ICD-10-CM

## 2025-04-03 DIAGNOSIS — Z67.91 RH NEGATIVE STATE IN ANTEPARTUM PERIOD (HCC): ICD-10-CM

## 2025-04-03 DIAGNOSIS — O99.810 ABNORMAL GLUCOSE TOLERANCE TEST IN PREGNANCY (HCC): ICD-10-CM

## 2025-04-03 DIAGNOSIS — O26.899 RH NEGATIVE STATE IN ANTEPARTUM PERIOD (HCC): ICD-10-CM

## 2025-04-03 DIAGNOSIS — O99.210 OBESITY AFFECTING PREGNANCY, ANTEPARTUM, UNSPECIFIED OBESITY TYPE (HCC): ICD-10-CM

## 2025-04-03 DIAGNOSIS — O30.049 DICHORIONIC DIAMNIOTIC TWIN PREGNANCY, ANTEPARTUM (HCC): ICD-10-CM

## 2025-04-03 DIAGNOSIS — O09.90 SUPERVISION OF HIGH RISK PREGNANCY, ANTEPARTUM (HCC): Primary | ICD-10-CM

## 2025-04-03 PROCEDURE — 90471 IMMUNIZATION ADMIN: CPT | Performed by: OBSTETRICS & GYNECOLOGY

## 2025-04-03 PROCEDURE — 90715 TDAP VACCINE 7 YRS/> IM: CPT | Performed by: OBSTETRICS & GYNECOLOGY

## 2025-04-03 PROCEDURE — 3074F SYST BP LT 130 MM HG: CPT | Performed by: OBSTETRICS & GYNECOLOGY

## 2025-04-03 PROCEDURE — 3008F BODY MASS INDEX DOCD: CPT | Performed by: OBSTETRICS & GYNECOLOGY

## 2025-04-03 PROCEDURE — 59025 FETAL NON-STRESS TEST: CPT | Performed by: OBSTETRICS & GYNECOLOGY

## 2025-04-03 PROCEDURE — 3078F DIAST BP <80 MM HG: CPT | Performed by: OBSTETRICS & GYNECOLOGY

## 2025-04-03 NOTE — PATIENT INSTRUCTIONS
Labor Instructions    How do I know if it’s true labor?  One of the most important aspects of any pregnancy is being able to recognize the onset of labor.  Unfortunately, on occasion it can be difficult or confusing, especially if you have had one or more children.  Each labor can begin in a different way even if you have had four or five children.    If this is your first child, it is very common to have labor for an average greater than 10 hours; however, there have been rare instances for labor to be two hours or less for a first time mother. It is more important for you to know if this is your second or third baby to realize that any labor after the first is usually shorter.  There is no way to tell how long or short the labor will be. Therefore, please call us if you are unsure labor has started.       Usually, during the last six weeks of pregnancy, Servando-Alvarado contractions or “false labor pains occur”.  False labor is generally not very painful though it is not always easy to tell.  You may feel contractions, cramps or uterine tightening somewhere between every 3-30 minutes but they will not continue to get stronger over time.  If you lie down, drink plenty of fluid or walk around, the contractions may go away.   False contractions are very common if you have been active on your feet for several hours.   Women frequently worry about being sent home from the hospital without having their baby (i.e. the labor stopped).  Actually, this is an unnecessary worry, for this is an infrequent occurrence.     True labor usually begins in one of two ways.  In most patients it begins with contractions of the uterus, which are irregular (but not always) in the beginning.  They are cramp-like in character and feel similar to menstrual cramps.  After a while, they become more regular, and they seem to last a little longer, and feel a little sharper.  These symptoms are very important-more important- than the timing of the  contractions.  Having regular (usually closer), longer lasting (35-70 seconds), and sharper (more painful) contractions are the common symptoms of actual labor.  The second way in which labor can begin which occurs in approximately 30% of all patients is the rupture of the bag of water.  This is a sudden gush of watery fluid, usually sufficient to run down your leg and onto the floor, or you may wet a large area of the bed if it happens at night.  There may also be tricking that is uncontrolled.  If you are unsure, please call the office.      When should I call?  When contractions are strong and every 3-5 minutes.  If you have a gush of water or you think you might be leaking fluid.  If you are bleeding heavily.  If your baby is not moving around at least every 1-2 hours.  If you are worried about something.  When you think you are ready to go to the hospital.    Who do I call?  Call the office at [phone].  If the office is closed, the answering service will send a message to the physician on call.  The on call physician will be available for emergency phone calls only.          Can I eat in labor?  It is good to eat a light meal at home before going to the hospital.  Eat foods like crackers, popsicles, soup and fruit.  Avoid foods that are difficult to digest like meat, a lot of dairy products and high fat foods.  DO NOT EAT if you know you are scheduled for a  ().      What will happen when I get to the hospital?  When you arrive at the hospital, you will be admitted and examined.   There are a few factors that will determine if you will be allowed to be up out of bed or if you would need to stay in bed.  The main factors are how well the baby is entering into the pelvis and if the bag of taveras is in intact or ruptured.  An intravenous (IV) solution will, with exceptions, be started.  This is extremely important especially for the baby.   Your  will be allowed in the room during your  labor. During the delivery, the nurses will inform you of the hospital policy and how many coaches are allowed.  You may desire pain medication or anesthesia for pain.  You probably discussed some aspects of pain medication with us during your prenatal care.  The various options may also have been discussed in Prenatal Classes.  We utilize IV narcotics and epidural anesthesia when our patients request to have them.  If you chose to have no anesthesia, none will be administered.  A local anesthetic may be used at the time of delivery      What should I to bring to the hospital?  Maternity clothes to go home in  You can bring your own night gown to wear after giving birth, but most women prefer to wear the hospital gown because it may get soiled  Nursing Bra if you are planning to breastfeed  Clothes for your baby to go home in  Baby Car Seat.  Be sure you know how to install it correctly. Please install it before going to the hospital  Routine toiletries like toothbrush, shampoo, hairbrush and etc.   You can bring your favorite pillow, but please put a colored pillow case on it so it doesn’t get mixed up with hospital pillows    How long will I stay in the hospital?  The date you leave the hospital may vary depending on the speed of your recovery.  If you have a vaginal delivery, you will stay in the hospital 24-48 hours after your delivery as long as you aren’t having any complications.    If you have had a , you will stay in the hospital 48-72 hours as long as you aren’t having any complications.       Going Home Instructions  There are no set rules as to what you may do each day or week after you are home.  You will receive discharge instructions to help you each day.  Remember, early ambulation in the hospital is to prevent complications.  Do not let this lull you into a false sense of strength or ability to do certain physical acts which may tire you excessively.  Please call the office within a few days  after you are discharged from the hospital to schedule your post-partum visit, which is usually 4-6 weeks after delivery.    Any medications necessary will be discussed on an individual basis.  If you decide to breastfeed your baby, you should continue your prenatal vitamins.  If you do not breastfeed, simply finish the prenatal vitamins you have.      The staff at [facility] wish you a joyous and exciting birth.  If there is anything we can do to make this a better experience for you please do not hesitate to ask.

## 2025-04-03 NOTE — TELEPHONE ENCOUNTER
Per Dr. Grubbs's request, note sent to pt regarding pt's request to start her [employer] on 4/16/25. Message sent to pt in [employer].

## 2025-04-03 NOTE — PROGRESS NOTES
SHILPA    GA: 34w2d  Vitals:    25 0916   BP: 120/76   Pulse: 90   Weight: 213 lb (96.6 kg)   Height: 64\"       Doing well, +FM x 2  Denies LOF/VB/uctx  Rh negative s/p Rhogam, TDAP today received, EPDS 0      Assessment:     Krista Collado is a 34 year old  at 34w2d who presents for routine OB visit. Overall doing well.     Problem List Items Addressed This Visit          Endocrine and Metabolic    Abnormal glucose tolerance test in pregnancy ([facility])       Gravid and     Surrogate pregnancy ([facility])    Pregnancy resulting from in vitro fertilization, antepartum ([facility])    Antepartum multigravida of advanced maternal age ([facility])    Relevant Orders    Fetal Non Stress Test [58083] (Completed)    Obesity affecting pregnancy, antepartum ([facility])    Relevant Orders    Fetal Non Stress Test [57719] (Completed)    Dichorionic diamniotic twin pregnancy, antepartum ([facility])    Relevant Orders    Fetal Non Stress Test [30564] (Completed)    Rh negative state in antepartum period ([facility])    Supervision of high risk pregnancy, antepartum ([facility]) - Primary     Other Visit Diagnoses       Need for vaccination        Relevant Orders    Immunization Admin Counseling, 1st Component, 18 years and older    TdaP (Boostrix/Adacel) Vaccine (> 7 Y)                Plan:     Patient Active Problem List    Diagnosis    Supervision of high risk pregnancy, antepartum ([facility])    Abnormal glucose tolerance test in pregnancy ([facility])     [ x ] Check 3 hr GTT- normal      Rh negative state in antepartum period ([facility])     [ x ] Will need second trimester antibody screen and Rhogam- done 25      Surrogate pregnancy ([facility])     IVF pregnancy with frozen embryo x 2. Parents live in [place].   [ ] medical records     TDAP DECLINED      Pregnancy resulting from in vitro fertilization, antepartum ([facility])     [X] MFM      Antepartum multigravida of advanced maternal age ([facility])     Low dose ASA  [X] MFM      Obesity affecting pregnancy, antepartum ([facility])      Low dose ASA      Dichorionic diamniotic twin pregnancy, antepartum ([facility])     [x] s/p MFM consult 10/31  Normal baseline [facility] labs  MFM Recommendations:  Fetal echocardiogram at 24 weeks  Monthly growth ultrasounds in the third trimester:  32 WGA Twin A - IUP in cephalic,left presentation, Placenta is anterior. EFW 1739 g ( 3 lb 13 oz); 29%. MVP is 7.6 cm. BPP is 8/8.  Twin B - IUP in cephalic, right and presenting presentation. Placenta is anterior. EFW 1964 g (4 lb 5 oz); 46%.; Discordance - 11.5 %   Weekly NST's at 32 weeks.  Monitor for signs or symptoms of  labor, preeclampsia or fetal growth disturbances.  In the absence of other maternal or fetal indications, delivery advised at 38 weeks gestation.  Pt is desiring vaginal delivery. Discussed risks of baby B not being cephalic, in which case I discussed risks of ECV (fetal intolerance, placental abruption), internal podalic version, breech extraction (head entrapment, fractured clavicle/humerus, nerve injury, hypoxia and fetal death) and risk of emergency . IOL request sent      Nausea and vomiting in pregnancy ([facility])    Major depressive disorder, single episode, severe without psychotic features ([facility])       - continue routine prenatal care   - labor and rupture of membrane precautions provided  - Fetal movement instructions given  - Requested to start maternity leave on 25. She would like to remove herself from work at least 2 weeks from delivery.  Patient states that is difficult for her to perform her full-time job duties.  Patient states that she becomes more uncomfortable physically at work.  She is also concerned about the health of the pregnancy.  Letter provided to the patient with her request.  Letter stating that her request is not unreasonable and agreeable with her request due to high risk pregnancy.    Return to clinic in 1 week for SHILPA visit w/ NST         Note to patient and family   The 21st Century Cures Act makes medical  notes available to patients in the interest of transparency.  However, please be advised that this is a medical document.  It is intended as [id number] communication.  It is written and medical language may contain abbreviations or verbiage that are technical and unfamiliar.  It may appear blunt or direct.  Medical documents are intended to carry relevant information, facts as evident, and the clinical opinion of the practitioner.

## 2025-04-04 ENCOUNTER — PATIENT MESSAGE (OUTPATIENT)
Dept: OBGYN CLINIC | Facility: CLINIC | Age: 35
End: 2025-04-04

## 2025-04-09 ENCOUNTER — ROUTINE PRENATAL (OUTPATIENT)
Dept: OBGYN CLINIC | Facility: CLINIC | Age: 35
End: 2025-04-09
Payer: COMMERCIAL

## 2025-04-09 ENCOUNTER — TELEPHONE (OUTPATIENT)
Dept: OBGYN CLINIC | Facility: CLINIC | Age: 35
End: 2025-04-09

## 2025-04-09 ENCOUNTER — APPOINTMENT (OUTPATIENT)
Dept: OBGYN CLINIC | Facility: CLINIC | Age: 35
End: 2025-04-09
Payer: COMMERCIAL

## 2025-04-09 ENCOUNTER — ULTRASOUND ENCOUNTER (OUTPATIENT)
Dept: OBGYN CLINIC | Facility: CLINIC | Age: 35
End: 2025-04-09
Payer: COMMERCIAL

## 2025-04-09 VITALS
BODY MASS INDEX: 38 KG/M2 | DIASTOLIC BLOOD PRESSURE: 76 MMHG | SYSTOLIC BLOOD PRESSURE: 120 MMHG | WEIGHT: 218.81 LBS | HEART RATE: 97 BPM

## 2025-04-09 DIAGNOSIS — Z3A.35 35 WEEKS GESTATION OF PREGNANCY (HCC): Primary | ICD-10-CM

## 2025-04-09 PROCEDURE — 3078F DIAST BP <80 MM HG: CPT | Performed by: STUDENT IN AN ORGANIZED HEALTH CARE EDUCATION/TRAINING PROGRAM

## 2025-04-09 PROCEDURE — 76818 FETAL BIOPHYS PROFILE W/NST: CPT | Performed by: STUDENT IN AN ORGANIZED HEALTH CARE EDUCATION/TRAINING PROGRAM

## 2025-04-09 PROCEDURE — 3074F SYST BP LT 130 MM HG: CPT | Performed by: STUDENT IN AN ORGANIZED HEALTH CARE EDUCATION/TRAINING PROGRAM

## 2025-04-09 NOTE — TELEPHONE ENCOUNTER
Pt dropped off [facility] paperwork in the office did not collect fee, real on file, forms sent to forms dept via email and interoffice .

## 2025-04-09 NOTE — PROGRESS NOTES
RETURN OB 35-41 WGA      GA: 35w1d  Vitals:    25 1049   BP: 120/76   Pulse: 97   Weight: 218 lb 12.8 oz (99.2 kg)       Doing well, +FM  Denies LOF/VB/uctx  Mode of delivery:   anticipated  Fetal movement count given  Labor precautions provided   Cephalic on ultrasound    Patient Active Problem List    Diagnosis    Supervision of high risk pregnancy, antepartum ([facility])    Abnormal glucose tolerance test in pregnancy ([facility])     [ x ] Check 3 hr GTT- normal      Rh negative state in antepartum period ([facility])     [ x ] Will need second trimester antibody screen and Rhogam- done 25      Surrogate pregnancy ([facility])     IVF pregnancy with frozen embryo x 2. Parents live in [place].   [ ] medical records     TDAP DECLINED      Pregnancy resulting from in vitro fertilization, antepartum ([facility])     [X] MFM      Antepartum multigravida of advanced maternal age ([facility])     Low dose ASA  [X] MFM      Obesity affecting pregnancy, antepartum ([facility])     Low dose ASA      Dichorionic diamniotic twin pregnancy, antepartum ([facility])     [x] s/p MFM consult 10/31  Normal baseline PIH labs  MF Recommendations:  Fetal echocardiogram at 24 weeks  Monthly growth ultrasounds in the third trimester:  32 WGA Twin A - IUP in cephalic,left presentation, Placenta is anterior. EFW 1739 g ( 3 lb 13 oz); 29%. MVP is 7.6 cm. BPP is 8/8.  Twin B - IUP in cephalic, right and presenting presentation. Placenta is anterior. EFW 1964 g (4 lb 5 oz); 46%.; Discordance - 11.5 %   Weekly NST's at 32 weeks.  Monitor for signs or symptoms of  labor, preeclampsia or fetal growth disturbances.  In the absence of other maternal or fetal indications, delivery advised at 38 weeks gestation.  Pt is desiring vaginal delivery. Discussed risks of baby B not being cephalic, in which case I discussed risks of ECV (fetal intolerance, placental abruption), internal podalic version, breech extraction (head entrapment, fractured clavicle/humerus, nerve injury,  hypoxia and fetal death) and risk of emergency . IOL request sent      Nausea and vomiting in pregnancy (HCC)    Major depressive disorder, single episode, severe without psychotic features (HCC)           RTC 1 week    NST  reactive but with variables. BPP .          Note to patient and family   The  [employer] Cures Act makes medical notes available to patients in the interest of transparency.  However, please be advised that this is a medical document.  It is intended as [id number] communication.  It is written and medical language may contain abbreviations or verbiage that are technical and unfamiliar.  It may appear blunt or direct.  Medical documents are intended to carry relevant information, facts as evident, and the clinical opinion of the practitioner.    Nilo Gutierrez MD

## 2025-04-09 NOTE — PATIENT INSTRUCTIONS
KICK COUNT INSTRUCTIONS    After 28 weeks of pregnancy, we would like for you to monitor your baby’s movement daily by doing kick counts, an easy way for you to assess your baby’s well-being.    How to count kicks:  Choose a time when the baby is active, such as after a meal.  Sit comfortably or lie on your side, and count the number of movements in an hour… you should feel 10 movements in 2 hours    The evening hours seem to be the most convenient time to do this test, although you can also do this test anytime you are concerned about the baby’s movement.    Call the office right away at [phone] if you notice any of the following:  Your baby moves less than 10 times in 2 hours while you are doing kick counts.  Your baby moves much less often than on the days before.  You have not felt your baby move all day.

## 2025-04-10 RX ORDER — OMEPRAZOLE 20 MG/1
20 CAPSULE, DELAYED RELEASE ORAL
Qty: 30 CAPSULE | Refills: 3 | Status: SHIPPED | OUTPATIENT
Start: 2025-04-10

## 2025-04-11 NOTE — TELEPHONE ENCOUNTER
Family Medical Leave Act forms with valid Release of Information received via email. Logged for processing.

## 2025-04-15 NOTE — TELEPHONE ENCOUNTER
Dr. Gutierrez,    Please sign off on form if you agree to: disability and Family Medical Leave Act maternity     -Signature page will be the first page scanned  -From your Inbasket, Highlight the patient and click Chart   -Double click the 4/9/25 Forms Completion telephone encounter  -Scroll down to the Media section   -Click the blue Hyperlink: disability  4/15/25  Family Medical Leave Act   4/15/25  -Click Acknowledge located in the top right ribbon/menu   -Drag the mouse into the blank space of the document and a + sign will appear. Left click to   electronically sign the document.  -Once signed, simply exit out of the screen and you signature will be saved.     Thank you,     Sammie

## 2025-04-16 ENCOUNTER — TELEPHONE (OUTPATIENT)
Dept: OBGYN CLINIC | Facility: CLINIC | Age: 35
End: 2025-04-16

## 2025-04-16 ENCOUNTER — HOSPITAL ENCOUNTER (INPATIENT)
Facility: HOSPITAL | Age: 35
LOS: 2 days | Discharge: HOME | End: 2025-04-19
Attending: OBSTETRICS & GYNECOLOGY | Admitting: OBSTETRICS & GYNECOLOGY
Payer: COMMERCIAL

## 2025-04-16 ENCOUNTER — HOSPITAL ENCOUNTER (INPATIENT)
Facility: HOSPITAL | Age: 35
LOS: 3 days | Discharge: HOME | End: 2025-04-19
Attending: OBSTETRICS & GYNECOLOGY | Admitting: OBSTETRICS & GYNECOLOGY
Payer: COMMERCIAL

## 2025-04-16 PROBLEM — O16.3 ELEVATED BLOOD PRESSURE AFFECTING PREGNANCY IN THIRD TRIMESTER, ANTEPARTUM (HCC): Status: ACTIVE | Noted: 2025-04-16

## 2025-04-16 PROBLEM — Z34.90 PREGNANCY (HCC): Status: ACTIVE | Noted: 2025-04-16

## 2025-04-16 LAB
ALBUMIN SERPL-MCNC: 3.8 G/DL (ref 3.2–4.8)
ALBUMIN/GLOB SERPL: 1.6 {RATIO} (ref 1–2)
ALP LIVER SERPL-CCNC: 203 U/L (ref 37–98)
ALT SERPL-CCNC: 12 U/L (ref 10–49)
ANION GAP SERPL CALC-SCNC: 13 MMOL/L (ref 0–18)
ANTIBODY SCREEN: POSITIVE
AST SERPL-CCNC: 25 U/L (ref ?–34)
BASOPHILS # BLD AUTO: 0.03 X10(3) UL (ref 0–0.2)
BASOPHILS NFR BLD AUTO: 0.4 %
BILIRUB SERPL-MCNC: 0.2 MG/DL (ref 0.3–1.2)
BUN BLD-MCNC: 8 MG/DL (ref 9–23)
CALCIUM BLD-MCNC: 8.7 MG/DL (ref 8.7–10.6)
CHLORIDE SERPL-SCNC: 106 MMOL/L (ref 98–112)
CO2 SERPL-SCNC: 20 MMOL/L (ref 21–32)
CREAT BLD-MCNC: 0.75 MG/DL (ref 0.55–1.02)
CREAT UR-SCNC: 120.4 MG/DL
EGFRCR SERPLBLD CKD-EPI 2021: 106 ML/MIN/1.73M2 (ref 60–?)
EOSINOPHIL # BLD AUTO: 0.12 X10(3) UL (ref 0–0.7)
EOSINOPHIL NFR BLD AUTO: 1.6 %
ERYTHROCYTE [DISTWIDTH] IN BLOOD BY AUTOMATED COUNT: 14.6 %
FASTING STATUS PATIENT QL REPORTED: NO
GLOBULIN PLAS-MCNC: 2.4 G/DL (ref 2–3.5)
GLUCOSE BLD-MCNC: 89 MG/DL (ref 70–99)
HCT VFR BLD AUTO: 28.2 % (ref 35–48)
HGB BLD-MCNC: 9.1 G/DL (ref 12–16)
IMM GRANULOCYTES # BLD AUTO: 0.04 X10(3) UL (ref 0–1)
IMM GRANULOCYTES NFR BLD: 0.5 %
LYMPHOCYTES # BLD AUTO: 1.04 X10(3) UL (ref 1–4)
LYMPHOCYTES NFR BLD AUTO: 13.4 %
MCH RBC QN AUTO: 25.8 PG (ref 26–34)
MCHC RBC AUTO-ENTMCNC: 32.3 G/DL (ref 31–37)
MCV RBC AUTO: 79.9 FL (ref 80–100)
MONOCYTES # BLD AUTO: 0.93 X10(3) UL (ref 0.1–1)
MONOCYTES NFR BLD AUTO: 12 %
NEUTROPHILS # BLD AUTO: 5.58 X10 (3) UL (ref 1.5–7.7)
NEUTROPHILS # BLD AUTO: 5.58 X10(3) UL (ref 1.5–7.7)
NEUTROPHILS NFR BLD AUTO: 72.1 %
OSMOLALITY SERPL CALC.SUM OF ELEC: 286 MOSM/KG (ref 275–295)
PLATELET # BLD AUTO: 304 10(3)UL (ref 150–450)
POTASSIUM SERPL-SCNC: 4.4 MMOL/L (ref 3.5–5.1)
PROT SERPL-MCNC: 6.2 G/DL (ref 5.7–8.2)
PROT UR-MCNC: 80.1 MG/DL (ref ?–14)
PROT/CREAT UR-RTO: 0.67
RBC # BLD AUTO: 3.53 X10(6)UL (ref 3.8–5.3)
RH BLOOD TYPE: NEGATIVE
SODIUM SERPL-SCNC: 139 MMOL/L (ref 136–145)
T PALLIDUM AB SER QL IA: NONREACTIVE
URATE SERPL-MCNC: 5.1 MG/DL (ref 3.1–7.8)
WBC # BLD AUTO: 7.7 X10(3) UL (ref 4–11)

## 2025-04-16 RX ORDER — ROPIVACAINE HYDROCHLORIDE 5 MG/ML
30 INJECTION, SOLUTION EPIDURAL; INFILTRATION; PERINEURAL AS NEEDED
Status: DISCONTINUED | OUTPATIENT
Start: 2025-04-16 | End: 2025-04-17

## 2025-04-16 RX ORDER — SODIUM CHLORIDE, SODIUM LACTATE, POTASSIUM CHLORIDE, CALCIUM CHLORIDE 600; 310; 30; 20 MG/100ML; MG/100ML; MG/100ML; MG/100ML
INJECTION, SOLUTION INTRAVENOUS CONTINUOUS
Status: DISCONTINUED | OUTPATIENT
Start: 2025-04-16 | End: 2025-04-17

## 2025-04-16 RX ORDER — ACETAMINOPHEN 500 MG
500 TABLET ORAL EVERY 6 HOURS PRN
Status: DISCONTINUED | OUTPATIENT
Start: 2025-04-16 | End: 2025-04-17

## 2025-04-16 RX ORDER — AMPICILLIN 2 G/1
INJECTION, POWDER, FOR SOLUTION INTRAVENOUS
Status: COMPLETED
Start: 2025-04-16 | End: 2025-04-16

## 2025-04-16 RX ORDER — 0.9 % SODIUM CHLORIDE 0.9 %
INTRAVENOUS SOLUTION INTRAVENOUS
Status: DISCONTINUED
Start: 2025-04-16 | End: 2025-04-16 | Stop reason: WASHOUT

## 2025-04-16 RX ORDER — ONDANSETRON 2 MG/ML
4 INJECTION INTRAMUSCULAR; INTRAVENOUS EVERY 6 HOURS PRN
Status: DISCONTINUED | OUTPATIENT
Start: 2025-04-16 | End: 2025-04-17

## 2025-04-16 RX ORDER — ACETAMINOPHEN 500 MG
1000 TABLET ORAL EVERY 6 HOURS PRN
Status: DISCONTINUED | OUTPATIENT
Start: 2025-04-16 | End: 2025-04-17

## 2025-04-16 RX ORDER — IBUPROFEN 600 MG/1
600 TABLET, FILM COATED ORAL ONCE AS NEEDED
Status: DISCONTINUED | OUTPATIENT
Start: 2025-04-16 | End: 2025-04-17

## 2025-04-16 RX ORDER — DEXTROSE, SODIUM CHLORIDE, SODIUM LACTATE, POTASSIUM CHLORIDE, AND CALCIUM CHLORIDE 5; .6; .31; .03; .02 G/100ML; G/100ML; G/100ML; G/100ML; G/100ML
INJECTION, SOLUTION INTRAVENOUS AS NEEDED
Status: DISCONTINUED | OUTPATIENT
Start: 2025-04-16 | End: 2025-04-17

## 2025-04-16 RX ORDER — TERBUTALINE SULFATE 1 MG/ML
0.25 INJECTION SUBCUTANEOUS AS NEEDED
Status: DISCONTINUED | OUTPATIENT
Start: 2025-04-16 | End: 2025-04-17

## 2025-04-16 RX ORDER — CITRIC ACID/SODIUM CITRATE 334-500MG
30 SOLUTION, ORAL ORAL AS NEEDED
Status: DISCONTINUED | OUTPATIENT
Start: 2025-04-16 | End: 2025-04-17

## 2025-04-17 ENCOUNTER — ANESTHESIA (OUTPATIENT)
Dept: OBGYN UNIT | Facility: HOSPITAL | Age: 35
End: 2025-04-17
Payer: COMMERCIAL

## 2025-04-17 ENCOUNTER — ANESTHESIA EVENT (OUTPATIENT)
Dept: OBGYN UNIT | Facility: HOSPITAL | Age: 35
End: 2025-04-17
Payer: COMMERCIAL

## 2025-04-17 PROBLEM — O14.03 MILD PREECLAMPSIA, THIRD TRIMESTER (HCC): Status: ACTIVE | Noted: 2025-04-16

## 2025-04-17 LAB
APTT PPP: 21.8 SECONDS (ref 23–36)
BASOPHILS # BLD AUTO: 0.02 X10(3) UL (ref 0–0.2)
BASOPHILS # BLD AUTO: 0.04 X10(3) UL (ref 0–0.2)
BASOPHILS NFR BLD AUTO: 0.2 %
BASOPHILS NFR BLD AUTO: 0.4 %
EOSINOPHIL # BLD AUTO: 0.04 X10(3) UL (ref 0–0.7)
EOSINOPHIL # BLD AUTO: 0.05 X10(3) UL (ref 0–0.7)
EOSINOPHIL NFR BLD AUTO: 0.4 %
EOSINOPHIL NFR BLD AUTO: 0.5 %
ERYTHROCYTE [DISTWIDTH] IN BLOOD BY AUTOMATED COUNT: 14.6 %
ERYTHROCYTE [DISTWIDTH] IN BLOOD BY AUTOMATED COUNT: 14.9 %
FETAL SCREEN RESULT: NEGATIVE
FIBRINOGEN PPP-MCNC: 332 MG/DL (ref 200–480)
HCT VFR BLD AUTO: 24.5 % (ref 35–48)
HCT VFR BLD AUTO: 25.6 % (ref 35–48)
HGB BLD-MCNC: 7.6 G/DL (ref 12–16)
HGB BLD-MCNC: 8.2 G/DL (ref 12–16)
IMM GRANULOCYTES # BLD AUTO: 0.06 X10(3) UL (ref 0–1)
IMM GRANULOCYTES # BLD AUTO: 0.06 X10(3) UL (ref 0–1)
IMM GRANULOCYTES NFR BLD: 0.6 %
IMM GRANULOCYTES NFR BLD: 0.6 %
INR BLD: 1.1 (ref 0.8–1.2)
LYMPHOCYTES # BLD AUTO: 0.6 X10(3) UL (ref 1–4)
LYMPHOCYTES # BLD AUTO: 0.79 X10(3) UL (ref 1–4)
LYMPHOCYTES NFR BLD AUTO: 5.5 %
LYMPHOCYTES NFR BLD AUTO: 8.5 %
MCH RBC QN AUTO: 25.5 PG (ref 26–34)
MCH RBC QN AUTO: 25.9 PG (ref 26–34)
MCHC RBC AUTO-ENTMCNC: 31 G/DL (ref 31–37)
MCHC RBC AUTO-ENTMCNC: 32 G/DL (ref 31–37)
MCV RBC AUTO: 81 FL (ref 80–100)
MCV RBC AUTO: 82.2 FL (ref 80–100)
MONOCYTES # BLD AUTO: 0.71 X10(3) UL (ref 0.1–1)
MONOCYTES # BLD AUTO: 0.94 X10(3) UL (ref 0.1–1)
MONOCYTES NFR BLD AUTO: 7.6 %
MONOCYTES NFR BLD AUTO: 8.6 %
NEUTROPHILS # BLD AUTO: 7.65 X10 (3) UL (ref 1.5–7.7)
NEUTROPHILS # BLD AUTO: 7.65 X10(3) UL (ref 1.5–7.7)
NEUTROPHILS # BLD AUTO: 9.21 X10 (3) UL (ref 1.5–7.7)
NEUTROPHILS # BLD AUTO: 9.21 X10(3) UL (ref 1.5–7.7)
NEUTROPHILS NFR BLD AUTO: 82.4 %
NEUTROPHILS NFR BLD AUTO: 84.7 %
PLATELET # BLD AUTO: 241 10(3)UL (ref 150–450)
PLATELET # BLD AUTO: 273 10(3)UL (ref 150–450)
PROTHROMBIN TIME: 14.3 SECONDS (ref 11.6–14.8)
RBC # BLD AUTO: 2.98 X10(6)UL (ref 3.8–5.3)
RBC # BLD AUTO: 3.16 X10(6)UL (ref 3.8–5.3)
WBC # BLD AUTO: 10.9 X10(3) UL (ref 4–11)
WBC # BLD AUTO: 9.3 X10(3) UL (ref 4–11)

## 2025-04-17 PROCEDURE — 10907ZC DRAINAGE OF AMNIOTIC FLUID, THERAPEUTIC FROM PRODUCTS OF CONCEPTION, VIA NATURAL OR ARTIFICIAL OPENING: ICD-10-PCS | Performed by: OBSTETRICS & GYNECOLOGY

## 2025-04-17 PROCEDURE — 30233N1 TRANSFUSION OF NONAUTOLOGOUS RED BLOOD CELLS INTO PERIPHERAL VEIN, PERCUTANEOUS APPROACH: ICD-10-PCS | Performed by: OBSTETRICS & GYNECOLOGY

## 2025-04-17 PROCEDURE — 59899 UNLISTED PX MAT CARE&DLVR: CPT | Performed by: OBSTETRICS & GYNECOLOGY

## 2025-04-17 PROCEDURE — 59400 OBSTETRICAL CARE: CPT | Performed by: OBSTETRICS & GYNECOLOGY

## 2025-04-17 PROCEDURE — 3E0P7VZ INTRODUCTION OF HORMONE INTO FEMALE REPRODUCTIVE, VIA NATURAL OR ARTIFICIAL OPENING: ICD-10-PCS | Performed by: OBSTETRICS & GYNECOLOGY

## 2025-04-17 PROCEDURE — 0W3R7ZZ CONTROL BLEEDING IN GENITOURINARY TRACT, VIA NATURAL OR ARTIFICIAL OPENING: ICD-10-PCS | Performed by: OBSTETRICS & GYNECOLOGY

## 2025-04-17 RX ORDER — BISACODYL 10 MG
10 SUPPOSITORY, RECTAL RECTAL ONCE AS NEEDED
Status: DISCONTINUED | OUTPATIENT
Start: 2025-04-17 | End: 2025-04-19

## 2025-04-17 RX ORDER — SIMETHICONE 80 MG
80 TABLET,CHEWABLE ORAL 3 TIMES DAILY PRN
Status: DISCONTINUED | OUTPATIENT
Start: 2025-04-17 | End: 2025-04-19

## 2025-04-17 RX ORDER — BUPIVACAINE HCL/0.9 % NACL/PF 0.25 %
5 PLASTIC BAG, INJECTION (ML) EPIDURAL AS NEEDED
Status: DISCONTINUED | OUTPATIENT
Start: 2025-04-17 | End: 2025-04-17

## 2025-04-17 RX ORDER — MISOPROSTOL 200 UG/1
1000 TABLET ORAL ONCE
Status: COMPLETED | OUTPATIENT
Start: 2025-04-17 | End: 2025-04-17

## 2025-04-17 RX ORDER — MISOPROSTOL 200 UG/1
TABLET ORAL
Status: COMPLETED
Start: 2025-04-17 | End: 2025-04-17

## 2025-04-17 RX ORDER — TRANEXAMIC ACID 10 MG/ML
INJECTION, SOLUTION INTRAVENOUS AS NEEDED
Status: DISCONTINUED | OUTPATIENT
Start: 2025-04-17 | End: 2025-04-17 | Stop reason: SURG

## 2025-04-17 RX ORDER — CARBOPROST TROMETHAMINE 250 UG/ML
INJECTION, SOLUTION INTRAMUSCULAR
Status: DISCONTINUED
Start: 2025-04-17 | End: 2025-04-17 | Stop reason: WASHOUT

## 2025-04-17 RX ORDER — TRANEXAMIC ACID 10 MG/ML
INJECTION, SOLUTION INTRAVENOUS
Status: DISCONTINUED
Start: 2025-04-17 | End: 2025-04-17

## 2025-04-17 RX ORDER — ACETAMINOPHEN 500 MG
1000 TABLET ORAL EVERY 6 HOURS PRN
Status: DISCONTINUED | OUTPATIENT
Start: 2025-04-17 | End: 2025-04-19

## 2025-04-17 RX ORDER — ONDANSETRON 2 MG/ML
INJECTION INTRAMUSCULAR; INTRAVENOUS
Status: COMPLETED
Start: 2025-04-17 | End: 2025-04-17

## 2025-04-17 RX ORDER — IBUPROFEN 600 MG/1
600 TABLET, FILM COATED ORAL EVERY 6 HOURS
Status: DISCONTINUED | OUTPATIENT
Start: 2025-04-17 | End: 2025-04-19

## 2025-04-17 RX ORDER — ACETAMINOPHEN 500 MG
500 TABLET ORAL EVERY 6 HOURS PRN
Status: DISCONTINUED | OUTPATIENT
Start: 2025-04-17 | End: 2025-04-19

## 2025-04-17 RX ORDER — HYDROMORPHONE HYDROCHLORIDE 1 MG/ML
1 INJECTION, SOLUTION INTRAMUSCULAR; INTRAVENOUS; SUBCUTANEOUS EVERY 2 HOUR PRN
Refills: 0 | Status: DISCONTINUED | OUTPATIENT
Start: 2025-04-17 | End: 2025-04-17

## 2025-04-17 RX ORDER — NALBUPHINE HYDROCHLORIDE 10 MG/ML
2.5 INJECTION INTRAMUSCULAR; INTRAVENOUS; SUBCUTANEOUS
Status: DISCONTINUED | OUTPATIENT
Start: 2025-04-17 | End: 2025-04-17

## 2025-04-17 RX ORDER — LIDOCAINE HYDROCHLORIDE AND EPINEPHRINE 15; 5 MG/ML; UG/ML
5 INJECTION, SOLUTION EPIDURAL AS NEEDED
Status: DISCONTINUED | OUTPATIENT
Start: 2025-04-17 | End: 2025-04-17

## 2025-04-17 RX ORDER — SODIUM CHLORIDE 9 MG/ML
INJECTION, SOLUTION INTRAVENOUS ONCE
Status: DISCONTINUED | OUTPATIENT
Start: 2025-04-17 | End: 2025-04-19

## 2025-04-17 RX ORDER — LIDOCAINE HYDROCHLORIDE AND EPINEPHRINE 15; 5 MG/ML; UG/ML
INJECTION, SOLUTION EPIDURAL AS NEEDED
Status: DISCONTINUED | OUTPATIENT
Start: 2025-04-17 | End: 2025-04-17 | Stop reason: SURG

## 2025-04-17 RX ORDER — LIDOCAINE HYDROCHLORIDE 20 MG/ML
5 INJECTION, SOLUTION EPIDURAL; INFILTRATION; INTRACAUDAL; PERINEURAL AS NEEDED
Status: DISCONTINUED | OUTPATIENT
Start: 2025-04-17 | End: 2025-04-17

## 2025-04-17 RX ORDER — AMMONIA 15 % (W/V)
0.3 AMPUL (EA) INHALATION AS NEEDED
Status: DISCONTINUED | OUTPATIENT
Start: 2025-04-17 | End: 2025-04-19

## 2025-04-17 RX ORDER — DOCUSATE SODIUM 100 MG/1
100 CAPSULE, LIQUID FILLED ORAL
Status: DISCONTINUED | OUTPATIENT
Start: 2025-04-17 | End: 2025-04-19

## 2025-04-17 RX ORDER — BUPIVACAINE HYDROCHLORIDE 2.5 MG/ML
30 INJECTION, SOLUTION EPIDURAL; INFILTRATION; INTRACAUDAL; PERINEURAL AS NEEDED
Status: DISCONTINUED | OUTPATIENT
Start: 2025-04-17 | End: 2025-04-17

## 2025-04-17 RX ORDER — SODIUM CHLORIDE 9 MG/ML
INJECTION, SOLUTION INTRAVENOUS CONTINUOUS
Status: DISCONTINUED | OUTPATIENT
Start: 2025-04-17 | End: 2025-04-19

## 2025-04-17 RX ORDER — SODIUM CHLORIDE 9 MG/ML
10 INJECTION, SOLUTION INTRAMUSCULAR; INTRAVENOUS; SUBCUTANEOUS AS NEEDED
Status: DISCONTINUED | OUTPATIENT
Start: 2025-04-17 | End: 2025-04-17

## 2025-04-17 RX ORDER — TRANEXAMIC ACID 10 MG/ML
1000 INJECTION, SOLUTION INTRAVENOUS ONCE
Status: DISCONTINUED | OUTPATIENT
Start: 2025-04-17 | End: 2025-04-17

## 2025-04-17 RX ADMIN — TRANEXAMIC ACID 1000 MG: 10 INJECTION, SOLUTION INTRAVENOUS at 14:20:00

## 2025-04-17 RX ADMIN — LIDOCAINE HYDROCHLORIDE AND EPINEPHRINE 3 ML: 15; 5 INJECTION, SOLUTION EPIDURAL at 10:43:00

## 2025-04-17 NOTE — PLAN OF CARE
Problem: ANTEPARTUM/LABOR and DELIVERY  Goal: Maintain pregnancy as long as maternal and/or fetal condition is stable  Description: INTERVENTIONS:- Maternal surveillance- Fetal surveillance- Monitor uterine activity- Medications as ordered- Bedrest  Outcome: Progressing  Goal: Anxiety is at manageable level  Description: INTERVENTIONS:- [place] patient to unit/surroundings- Establish a trusting relationship with patient- Discuss possible complications or alterations in birth plan- Explain treatment plan- Explain testing/procedures prior to initiation- Encourage participation in care- Encourage verbalization of concerns/fears- Identify coping mechanisms- Administer/offer alternative therapies (Aroma therapy, distraction, guided imagery, massage, music therapy, therapeutic touch)- Manage patient's environment (Avoid overstimulation of patient)  Outcome: Progressing  Goal: Demonstrates ability to cope with hospitalization/illness  Description: INTERVENTIONS:- Encourage verbalization of feelings/concerns/expectations- Provide quiet environment- Assist patient to identify own strengths and abilities- Encourage patient to set small goals for self- Encourage participation in diversional activity- Reinforce positive adaptation of new coping behaviors- Include patient/family/caregiver in decisions  Outcome: Progressing     Problem: Patient/Family Goals  Goal: Patient/Family Long Term Goal  Description: Patient's Long Term Goal: adequate pain management  Interventions:- follow prescribed POC  - See additional Care Plan goals for specific interventions  Outcome: Progressing  Goal: Patient/Family Short Term Goal  Description: Patient's Short Term Goal: safe vaginal delivery  Interventions: - follow prescribed POC  - See additional Care Plan goals for specific interventions  Outcome: Progressing     Problem: BIRTH - VAGINAL/ SECTION  Goal: Fetal and maternal status remain reassuring during the birth  process  Description: INTERVENTIONS:- Monitor vital signs- Monitor fetal heart rate- Monitor uterine activity- Monitor labor progression (vaginal delivery)- DVT prophylaxis (C/S delivery)- Surgical antibiotic prophylaxis (C/S delivery)  Outcome: Progressing     Problem: PAIN - ADULT  Goal: Verbalizes/displays adequate comfort level or patient's stated pain goal  Description: INTERVENTIONS:- Encourage pt to monitor pain and request assistance- Assess pain using appropriate pain scale- Administer analgesics based on type and severity of pain and evaluate response- Implement non-pharmacological measures as appropriate and evaluate response- Consider cultural and social influences on pain and pain management- Manage/alleviate anxiety- Utilize distraction and/or relaxation techniques- Monitor for opioid side effects- Notify MD/LIP if interventions unsuccessful or patient reports new pain- Anticipate increased pain with activity and pre-medicate as appropriate  Outcome: Progressing     Problem: ANXIETY  Goal: Will report anxiety at manageable levels  Description: INTERVENTIONS:- Administer medication as ordered- Teach and rehearse alternative coping skills- Provide emotional support with 1:1 interaction with staff  Outcome: Progressing

## 2025-04-17 NOTE — PROGRESS NOTES
Persistent mildly elevated blood pressures and urine PCR 0.67  Presentation consistent with preeclampsia without severe features.   Recommend proceed with cytotec IOL.

## 2025-04-17 NOTE — TELEPHONE ENCOUNTER
TC through answering service.  Patient 36 weeks pregnant, twins, complaining of blurry vision earlier today, now resolved, swelling, and elevated blood pressures 140/93, 144/101.  Instructed to come to [facility] for evaluation.

## 2025-04-17 NOTE — PROGRESS NOTES
at 36w2d with Di/Di twins admitted for IOL 2/2 preeclampsia without severe features.    Vitals:    25 0603 25 0615 25 0700 25 0715   BP: 139/87  (!) 151/100    BP Location: Left arm  Left arm    Pulse: 80  87 76   Resp: 16   14   Temp: 98.9 °F (37.2 °C)   98.2 °F (36.8 °C)   TempSrc: Oral   Oral   Weight:  218 lb (98.9 kg)     Height:             Baby A FHT: 145, moderate variability, + accels, no decels  Baby B FHT: 130, moderate variability, + accels, no decels  [place]: q3-4 min  Of note, the baby tracing as A is the presenting baby on the right who was referred to as baby B in ultrasounds.    SVE: /-2 with AROM of A    Recent Labs   Lab 25   HGB 9.1*   HCT 28.2*   WBC 7.7   .0   CREATSERUM 0.75   AST 25   ALT 12       Labor progress:  - s/p cervical ripening  - continue Pitocin per protocol  - GBS pos - second dose of amp going now, continue per protocol  - s/p AROM of A with clear fluid at 0845  - fetal wellbeing reassuring with cat 1 FHT x2  - plans for epidural  - preeclampsia without severe features - labs as above, BP normal to mild range, no antihypertensives, continue to monitor    Dispo: Continue IOL, anticipate   Plan for delivery in OR. Plan for biological parents to be in delivery room unless  indicated.    Marlin Steele MD  [facility]  2025 8:20 AM

## 2025-04-17 NOTE — H&P
[facility]  Obstetrics and Gynecology  Antepartum History & Physical    Krista Collado Patient Status:  Outpatient    1990 MRN [id number]   Location [facility] LABOR & DELIVERY Attending Nikolay Orellana MD   Hospital Day 0 PCP No primary care provider on file.     History of Present Illness:   Krista Collado is a 34 year old female  Patient's last menstrual period was 2024 (approximate). at 36w1d with twin pregnancy who presents complaining of elevated blood pressures at home 140/93 and 144/101.  Mild bilateral leg/foot swelling.  Intermittent mild headache today, not right now.  Had blurry vision earlier today, resolved.  Good fetal movement x 2.    Antepartum Complications: AMA, IVF/surrogate pregnancy, Rh negative, twins    OB Ultrasounds:   OB Ultrasound on 3/21/25,   EFW Twin A 29%, Twin B 46% Vtx/Vtx    OB History    Para Term  AB Living   3 2 2   2   SAB IAB Ectopic Multiple Live Births       2      # Outcome Date GA Lbr Kelvin/2nd Weight Sex Type Anes PTL Lv   3 Current            2 Term 16 40w0d  8 lb 1 oz (3.657 kg) F Vag-Spont EPI  MATEUSZ   1 Term 14 40w0d  8 lb (3.629 kg) F Vag-Spont EPI  MATEUSZ     Past Medical History[1]  Past Surgical History[2]    Medications:  Medications Ordered Prior to Encounter[3]    Allergies:  Allergies[4]     Social History:  Social History     Tobacco Use    Smoking status: Never    Smokeless tobacco: Never   Substance Use Topics    Alcohol use: Not Currently      Review of Systems:  Pertinent items are noted in HPI:    OBJECTIVE:     Vitals:    25   BP: (!) 148/103   Pulse: 94     Physical Examination:  General appearance: Well dressed, well nourished in no apparent distress  Neurologic/Psychiatric: Alert and oriented to person, place and time, mood normal, affect appropriate  Abdomen: Gravid, non-tender  Pelvic: cl/th/-3 vertex   Extremities: 1+ dependent edema    Vtx/Vtx by bedside ultrasound       ASSESSMENT/PLAN:   34 year old  36w1d  #Elevated blood pressures:   - Likely new onset gestational hypertension.  Will admit for observation.  If persistent elevated blood pressures, will recommend IOL with cytotec.  - Check labs  #Twin Gestation  - Vtx/Vtx  - Patent desires trial of labor  #IVF/Surrogate pregnancy  #Rh negative  - Rhogam PP    Problem List[5]    Nikolay Orellana MD  [facility]- Obstetrics & Gynecology        [1]   Past Medical History:   Surrogate pregnancy (HCC)   [2] No past surgical history on file.  [3]   No current facility-administered medications on file prior to encounter.     Current Outpatient Medications on File Prior to Encounter   Medication Sig Dispense Refill    omeprazole 20 MG Oral Capsule Delayed Release Take 1 capsule (20 mg total) by mouth every morning before breakfast. 30 capsule 3    prenatal vitamin with DHA 27-0.8-228 MG Oral Cap Take 1 capsule by mouth in the morning.      aspirin 81 MG Oral Tab EC Take 1 tablet (81 mg total) by mouth in the morning.     [4] No Known Allergies  [5]   Patient Active Problem List  Diagnosis    Surrogate pregnancy (HCC)    Pregnancy resulting from in vitro fertilization, antepartum ([facility])    Antepartum multigravida of advanced maternal age ([facility])    Obesity affecting pregnancy, antepartum ([facility])    Dichorionic diamniotic twin pregnancy, antepartum ([facility])    Nausea and vomiting in pregnancy ([facility])    Rh negative state in antepartum period ([facility])    Major depressive disorder, single episode, severe without psychotic features ([facility])    Abnormal glucose tolerance test in pregnancy ([facility])    Supervision of high risk pregnancy, antepartum ([facility])    Elevated blood pressure affecting pregnancy in third trimester, antepartum ([facility])

## 2025-04-17 NOTE — ANESTHESIA PROCEDURE NOTES
Labor Analgesia    Date/Time: 4/17/2025 10:37 AM    Performed by: Sabas Enciso DO  Authorized by: Sabas Enciso DO      General Information and Staff    Start Time:  4/17/2025 10:37 AM  End Time:  4/17/2025 10:43 AM  Anesthesiologist:  Sabas Enciso DO  Performed by:  Anesthesiologist  Patient Location:  OB  Site Identification: surface landmarks    Reason for Block: labor epidural    Preanesthetic Checklist: patient identified, IV checked, risks and benefits discussed, monitors and equipment checked, pre-op evaluation, timeout performed, IV bolus, anesthesia consent and sterile technique used      Procedure Details    Patient Position:  Sitting  Prep: ChloraPrep    Monitoring:  Heart rate and continuous pulse ox  Approach:  Midline    Epidural Needle    Injection Technique:  SERVANDO saline  Needle Type:  Tuohy  Needle Gauge:  17 G  Needle Length:  3.375 in  Location:  L3-4    Spinal Needle      Catheter    Catheter Type:  End hole  Catheter Size:  19 G  Test Dose:  Negative    Assessment      Additional Comments

## 2025-04-17 NOTE — PLAN OF CARE
Problem: ANTEPARTUM/LABOR and DELIVERY  Goal: Maintain pregnancy as long as maternal and/or fetal condition is stable  Description: INTERVENTIONS:- Maternal surveillance- Fetal surveillance- Monitor uterine activity- Medications as ordered- Bedrest  Outcome: Completed  Goal: Anxiety is at manageable level  Description: INTERVENTIONS:- [place] patient to unit/surroundings- Establish a trusting relationship with patient- Discuss possible complications or alterations in birth plan- Explain treatment plan- Explain testing/procedures prior to initiation- Encourage participation in care- Encourage verbalization of concerns/fears- Identify coping mechanisms- Administer/offer alternative therapies (Aroma therapy, distraction, guided imagery, massage, music therapy, therapeutic touch)- Manage patient's environment (Avoid overstimulation of patient)  Outcome: Completed  Goal: Demonstrates ability to cope with hospitalization/illness  Description: INTERVENTIONS:- Encourage verbalization of feelings/concerns/expectations- Provide quiet environment- Assist patient to identify own strengths and abilities- Encourage patient to set small goals for self- Encourage participation in diversional activity- Reinforce positive adaptation of new coping behaviors- Include patient/family/caregiver in decisions  Outcome: Completed     Problem: Patient/Family Goals  Goal: Patient/Family Long Term Goal  Description: Patient's Long Term Goal: adequate pain management  Interventions:- follow prescribed POC  - See additional Care Plan goals for specific interventions  Outcome: Completed  Goal: Patient/Family Short Term Goal  Description: Patient's Short Term Goal: safe vaginal delivery  Interventions: - follow prescribed POC  - See additional Care Plan goals for specific interventions  Outcome: Completed     Problem: BIRTH - VAGINAL/ SECTION  Goal: Fetal and maternal status remain reassuring during the birth process  Description:  INTERVENTIONS:- Monitor vital signs- Monitor fetal heart rate- Monitor uterine activity- Monitor labor progression (vaginal delivery)- DVT prophylaxis (C/S delivery)- Surgical antibiotic prophylaxis (C/S delivery)  Outcome: Completed     Problem: PAIN - ADULT  Goal: Verbalizes/displays adequate comfort level or patient's stated pain goal  Description: INTERVENTIONS:- Encourage pt to monitor pain and request assistance- Assess pain using appropriate pain scale- Administer analgesics based on type and severity of pain and evaluate response- Implement non-pharmacological measures as appropriate and evaluate response- Consider cultural and social influences on pain and pain management- Manage/alleviate anxiety- Utilize distraction and/or relaxation techniques- Monitor for opioid side effects- Notify MD/LIP if interventions unsuccessful or patient reports new pain- Anticipate increased pain with activity and pre-medicate as appropriate  Outcome: Completed     Problem: ANXIETY  Goal: Will report anxiety at manageable levels  Description: INTERVENTIONS:- Administer medication as ordered- Teach and rehearse alternative coping skills- Provide emotional support with 1:1 interaction with staff  Outcome: Completed

## 2025-04-17 NOTE — L&D DELIVERY NOTE
( 1)      Labor Events     labor?: Yes   steroids?: None  Antibiotics received during labor?: Yes  Antibiotics (enter # doses in comment): ampicillin  Rupture date/time: 2025 0843     Rupture type: AROM  Fluid color: Clear  Labor type: Induced Onset of Labor  Induction: Misoprostol, Oxytocin, AROM  Indications for induction: Preeclampsia, Multiple Gestation, Other - comment  Induction comment: IVF, Surrogate  Intrapartum & labor complications: Other - see comments  Intrapartum & labor complications comment: GBS unknown, treated with Ampicillin       Labor Event Times    Labor onset date/time: 2025 0841  Dilation complete date/time: 2025 1347  Start pushing date/time: 2025 13:57       Sabael Presentation    Presentation: Vertex  Position: Left Occiput Anterior       Operative Delivery    Operative Vaginal Delivery: No                Shoulder Dystocia    Shoulder Dystocia: No       Anesthesia    Method: Epidural              Sabael Delivery      Head delivery date/time: 2025 14:01:09   Delivery date/time:  25 14:01:16   Delivery type: Normal spontaneous vaginal delivery    Details:     Delivery location: OR       Delivery Providers    Delivering Clinician: Marlin Dupree MD   Delivery personnel:  Provider Role   Maria R Bojorquez, MAGDA Baby Nurse   Audrey Liu, RN Delivery Nurse   Medina Elena, RN Delivery Nurse   Sayra Beatty, RN Baby Nurse             Cord    Vessels: 3 Vessels  Complications: None  Timed cord clamping: Yes  Time in sec: 60  Cord blood disposition: to lab  Gases sent?: No       Resuscitation    Method: None       Sabael Measurements      Weight: 2480 g 5 lb 7.5 oz Length: 45.7 cm     Head circum.: 33 cm Chest circum.: 28.5 cm      Abdominal circum.: 26 cm           Placenta    Date/time: 2025 14:17  Removal: Expressed  Appearance: Intact  Disposition: held for future pathology       Apgars    Living status: Living   Apgar  Scoring Key:    0 1 2    Skin color Blue or pale Acrocyanotic Completely pink    Heart rate Absent <100 bpm >100 bpm    Reflex irritability No response Grimace Cry or active withdrawal    Muscle tone Limp Some flexion Active motion    Respiratory effort Absent Weak cry; hypoventilation Good, crying              1 Minute:  5 Minute:  10 Minute:  15 Minute:  20 Minute:      Skin color: 1  1       Heart rate: 2  2       Reflex irritablity: 2  2       Muscle tone: 2  2       Respiratory effort: 2  2       Total: 9  9          Apgars assigned by: RUSSELL KATZ       Skin to Skin    Skin to skin initiated date/time: 2025 1430  Skin to skin with: Other Family Member       Vaginal Count    Initial count RN: Audrey Liu RN  Initial count Tech: Byriene, Tamra   Sponges   Sharps    Initial counts 11   0    Final counts        Final count RN: Audrey Liu RN  Final count MD: Marlin Dupree MD       Lacerations    Episiotomy: None  Perineal lacerations: None      Vaginal laceration?: No      Cervical laceration?: No    Clitoral laceration?: No                ( 2)      Labor Events     labor?: Yes   steroids?: None  Antibiotics received during labor?: Yes  Rupture date/time: 2025 0843     Rupture type: AROM  Fluid color: Clear  Labor type: Induced Onset of Labor  Induction: Misoprostol, Oxytocin  Indications for induction: Preeclampsia  Intrapartum & labor complications: Other - see comments       Labor Event Times    Labor onset date/time: 2025 0841  Dilation complete date/time: 2025 1347  Start pushing date/time: 2025 13:57       Rogers Presentation    Presentation: Vertex  Position: Middle Occiput Posterior       Operative Delivery    Operative Vaginal Delivery: No                Shoulder Dystocia    Shoulder Dystocia: No       Anesthesia    Method: Epidural               Delivery      Head delivery date/time: 2025 14:10:55   Delivery date/time:  25  14:10:56   Delivery type: Normal spontaneous vaginal delivery    Details:     Delivery location: OR       Delivery Providers    Delivering Clinician: Marlin Dupree MD   Delivery personnel:  Provider Role   Maria R Bojorquez, MAGDA Baby Nurse   Audrey Liu RN Delivery Nurse   Medina Elena RN Delivery Nurse   Harry Amor MD Neonatologist   Sayra Beatty RN Baby Nurse             Cord    Vessels: 3 Vessels  Complications: None  Timed cord clamping: Yes  Time in sec: 60  Cord blood disposition: to lab  Gases sent?: No       Resuscitation    Method: Oxygen        Measurements      Weight: 2630 g 5 lb 12.8 oz Length: 47 cm     Head circum.: 33.5 cm Chest circum.: 29 cm      Abdominal circum.: 28 cm           Placenta    Date/time: 2025 14:17  Removal: Expressed  Appearance: Intact  Disposition: held for future pathology       Apgars    Living status: Living   Apgar Scoring Key:    0 1 2    Skin color Blue or pale Acrocyanotic Completely pink    Heart rate Absent <100 bpm >100 bpm    Reflex irritability No response Grimace Cry or active withdrawal    Muscle tone Limp Some flexion Active motion    Respiratory effort Absent Weak cry; hypoventilation Good, crying              1 Minute:  5 Minute:  10 Minute:  15 Minute:  20 Minute:      Skin color: 0  1       Heart rate: 2  2       Reflex irritablity: 2  2       Muscle tone: 2  2       Respiratory effort: 2  2       Total: 8  9          Apgars assigned by: RUSSELL KATZ   disposition: with mother       Skin to Skin    Skin to skin initiated date/time: 2025 1430  Skin to skin with: Father       Vaginal Count    Initial count RN: Audrey Liu RN  Initial count Tech: Byriene, Tamra   Sponges   Sharps    Initial counts 11   0    Final counts        Final count RN: Audrey Liu RN  Final count MD: Marlin Dupree MD       Lacerations    Episiotomy: None  Perineal lacerations: None      Vaginal laceration?: No      Cervical  laceration?: No    Clitoral laceration?: No                Called by RN that patient was 8cm dilated so she was moved to the operating room for delivery. Repeat SVE in OR 10/100/+2. Neonatologist in the room for delivery. Intended parents present as well.    Baby A's head was delivered with maternal effort in OA position. No nuchal cord noted. Anterior (right) shoulder was delivered with maternal effort and gentle downward traction. Posterior shoulder and body followed easily. Mouth and nose were bulb suctioned and baby BOY was held for drying/stimulation. Vigorous cry. Cord was clamped x2 after 60 sec delay and cut by FOB. Cord blood collected. He was brought to warmer for evaluation.    Baby B's position was confirmed cephalic. Head was delivered with maternal effort in OP position. No nuchal cord noted. Anterior (left) shoulder was delivered with maternal effort and gentle downward traction. Posterior shoulder and body followed easily. Mouth and nose were bulb suctioned and baby GIRL was held for drying/stimulation. Vigorous cry. Cord was clamped x2 after 60 sec delay and cut by FOB. Cord blood collected. She was brought to warmer for evaluation.    Placentas delivered together and complete with gentle cord traction. Uterus firm with massage, IV Pitocin given, minimal bleeding. However when massage was discontinued the uterus became atonic and bleeding significantly increased. 1000mcg cytotec given AK and 1000mg TXA given IV. Uterine tone and bleeding improved. Vagina/cervix examined and found to be intact.     Prior to the patient leaving the OR she had persistent gushing of vaginal bleeding. Decision made to place Nancy device for hemorrhage management. Nancy placed per 's directions with minimal further output thus far and excellent uterine tone. Patient tolerated the procedure well.  CBC and coags collected and sent to lab.  Called for release of 2 units pRBC and plan for transfusion of 1 unit while  labs process.    QBL 1400cc with delivery, EBL 1900cc total.  Hemorrhage?: Yes, patient has had a QBL > 1000ml within 24 hours of delivery or has symptomatic anemia from blood loss and meets ACOG criteria for post-partum hemorrhage.    Mother stable, plan for transfer to Mother/Baby after initial recovery.   Counts were correct at the end of the delivery.     Marlin Steele MD  [facility]  4/17/2025 3:08 PM

## 2025-04-17 NOTE — PROGRESS NOTES
at 36w2d with Di/Di twins admitted for IOL 2/2 preeclampsia without severe features.    Vitals:    25 1059 25 1100 25 1200 25 1300   BP: 145/90 145/90 143/87 139/86   BP Location: Left arm Left arm Left arm Left arm   Pulse: 87 87 72 63   Resp:       Temp:       TempSrc:       SpO2: 96% 96% 96% 93%   Weight:       Height:             Baby A FHT: 135, moderate variability, + accels, no decels  Baby B FHT: 125, moderate variability, + accels, no decels  [place]: q3-4 min  Of note, the baby tracing as A is the presenting baby on the right who was referred to as baby B in ultrasounds.    SVE: 8cm per RN      Labor progress:  - s/p cervical ripening  - continue Pitocin per protocol  - GBS pos - continue ampicillin per protocol  - s/p AROM of A with clear fluid at 0845  - fetal wellbeing reassuring with cat 1 FHT x2  - s/p epidural  - preeclampsia without severe features - BP normal to mild range, no antihypertensives, continue to monitor    Dispo: to OR for second stage of labor and anticipated delivery    Marlin Steele MD  [facility]  2025 1:38 PM

## 2025-04-17 NOTE — PROGRESS NOTES
Received patient alert and oriented x4 in DANIELLE 4 on cart with cardio (x2) and toco applied. Color pink/skin warm and dry. Dr. Orellana at bedside. Patient presents verbalizing onset of headache, blurred vision, and dizziness 0700 resolve in 1 hour. Headache reoccurred around 1200 noted swelling at 1345. Patient is a surrogate with DI/DI twins and an KARRI of [birth year]/13[birth year]2015 = 36 weeks 1 day gestation. Verbalizes fetal movements, abdomen palpates soft and non-tender, denies pain or discomfort with palpation. No vaginal bleeding or leaking of fluid noted from introitus. 1+ edema noted bilaterally in lower extremities, 1+ DTR's, negative clonus.

## 2025-04-17 NOTE — PLAN OF CARE
Problem: ANTEPARTUM/LABOR and DELIVERY  Goal: Maintain pregnancy as long as maternal and/or fetal condition is stable  Description: INTERVENTIONS:- Maternal surveillance- Fetal surveillance- Monitor uterine activity- Medications as ordered- Bedrest  Outcome: Progressing  Goal: Anxiety is at manageable level  Description: INTERVENTIONS:- Art patient to unit/surroundings- Establish a trusting relationship with patient- Discuss possible complications or alterations in birth plan- Explain treatment plan- Explain testing/procedures prior to initiation- Encourage participation in care- Encourage verbalization of concerns/fears- Identify coping mechanisms- Administer/offer alternative therapies (Aroma therapy, distraction, guided imagery, massage, music therapy, therapeutic touch)- Manage patient's environment (Avoid overstimulation of patient)  Outcome: Progressing  Goal: Demonstrates ability to cope with hospitalization/illness  Description: INTERVENTIONS:- Encourage verbalization of feelings/concerns/expectations- Provide quiet environment- Assist patient to identify own strengths and abilities- Encourage patient to set small goals for self- Encourage participation in diversional activity- Reinforce positive adaptation of new coping behaviors- Include patient/family/caregiver in decisions  Outcome: Progressing     Problem: Patient/Family Goals  Goal: Patient/Family Long Term Goal  Description: Patient's Long Term Goal: adequate pain management  Interventions:- follow prescribed POC  - See additional Care Plan goals for specific interventions  Outcome: Progressing  Goal: Patient/Family Short Term Goal  Description: Patient's Short Term Goal: safe vaginal delivery  Interventions: - follow prescribed POC  - See additional Care Plan goals for specific interventions  Outcome: Progressing

## 2025-04-17 NOTE — ANESTHESIA PREPROCEDURE EVALUATION
PRE-OP EVALUATION    Patient Name: Krista Collado    Admit Diagnosis: preganncy  Pregnancy (HCC)    Pre-op Diagnosis: * No surgery found *        Anesthesia Procedure: LABOR ANALGESIA    * Surgery not found *    Pre-op vitals reviewed.  Temp: 98.1 °F (36.7 °C)  Pulse: 77  Resp: 14  BP: 157/109  SpO2: 96 %  Body mass index is 37.42 kg/m².    Current medications reviewed.  Hospital Medications:  Current Medications[1]    Outpatient Medications:   Prescriptions Prior to Admission[2]    Allergies: Patient has no known allergies.      Anesthesia Evaluation    Patient summary reviewed.    Anesthetic Complications  (-) history of anesthetic complications         GI/Hepatic/Renal    Negative GI/hepatic/renal ROS.                             Cardiovascular    Negative cardiovascular ROS.    Exercise tolerance: good     MET: >4                                           Endo/Other    Negative endo/other ROS.                              Pulmonary    Negative pulmonary ROS.                       Neuro/Psych                                      Past Surgical History[3]  Social Hx on file[4]  History   Drug Use Unknown     Available pre-op labs reviewed.  Lab Results   Component Value Date    WBC 7.7 04/16/2025    RBC 3.53 (L) 04/16/2025    HGB 9.1 (L) 04/16/2025    HCT 28.2 (L) 04/16/2025    MCV 79.9 (L) 04/16/2025    MCH 25.8 (L) 04/16/2025    MCHC 32.3 04/16/2025    RDW 14.6 04/16/2025    .0 04/16/2025     Lab Results   Component Value Date     04/16/2025    K 4.4 04/16/2025     04/16/2025    CO2 20.0 (L) 04/16/2025    BUN 8 (L) 04/16/2025    CREATSERUM 0.75 04/16/2025    GLU 89 04/16/2025    CA 8.7 04/16/2025            Airway      Mallampati: II  Mouth opening: >3 FB  TM distance: 4 - 6 cm  Neck ROM: full Cardiovascular      Rhythm: regular  Rate: normal     Dental    Dentition appears grossly intact         Pulmonary      Breath sounds clear to auscultation bilaterally.               Other findings               ASA: 2   Plan: epidural  NPO status verified and           Plan/risks discussed with: patient (Risks of epidural discussed including bleeding, infection and nerve damage.)                Present on Admission:   Elevated blood pressure affecting pregnancy in third trimester, antepartum ([facility])   Antepartum multigravida of advanced maternal age ([facility])   Dichorionic diamniotic twin pregnancy, antepartum ([facility])   Rh negative state in antepartum period ([facility])             [1]    HYDROmorphone (Dilaudid) 1 MG/ML injection 1 mg  1 mg Intravenous Q2H PRN    lactated ringers IV bolus 1,000 mL  1,000 mL Intravenous Once    fentaNYL-bupivacaine 2 mcg/mL-0.125% in sodium chloride 0.9% 100 mL EPIDURAL infusion premix  12 mL/hr Epidural Continuous    fentaNYL (Sublimaze) 50 mcg/mL injection 100 mcg  100 mcg Epidural Once    lidocaine 1.5%-EPINEPHrine 1:200,000 (Xylocaine-Epinephrine) injection  5 mL Injection PRN    bupivacaine PF (Marcaine) 0.25% injection  30 mL Injection PRN    lidocaine PF (Xylocaine-MPF) 2% injection  5 mL Injection PRN    sodium chloride 0.9% PF injection 10 mL  10 mL Injection PRN    ePHEDrine (PF) 25 MG/5 ML injection 5 mg  5 mg Intravenous PRN    nalbuphine (Nubain) 10 mg/mL injection 2.5 mg  2.5 mg Intravenous Q15 Min PRN    oxyTOCIN in sodium chloride 0.9% (Pitocin) 30 Units/500mL infusion premix  0.5-20 john-units/min Intravenous Continuous    miSOPROStol (Cytotec) 200 MCG tab        tranexamic acid in sodium chloride 0.7% (Cyklokapron) 1000 mg/100mL infusion premix        carboprost tromethamine (Hemabate) 250 mcg/mL injection        lactated ringers infusion   Intravenous Continuous    dextrose in lactated ringers 5% infusion   Intravenous PRN    lactated ringers IV bolus 500 mL  500 mL Intravenous PRN    acetaminophen (Tylenol Extra Strength) tab 500 mg  500 mg Oral Q6H PRN    acetaminophen (Tylenol Extra Strength) tab 1,000 mg  1,000 mg Oral Q6H PRN    ibuprofen (Motrin) tab 600 mg  600 mg Oral  Once PRN    ondansetron (Zofran) 4 MG/2ML injection 4 mg  4 mg Intravenous Q6H PRN    oxyTOCIN in sodium chloride 0.9% (Pitocin) 30 Units/500mL infusion premix  62.5-900 john-units/min Intravenous Continuous    terbutaline (Brethine) 1 MG/ML injection 0.25 mg  0.25 mg Subcutaneous PRN    sodium citrate-citric acid (Bicitra) 500-334 MG/5ML oral solution 30 mL  30 mL Oral PRN    ropivacaine (Naropin) 0.5% injection  30 mL Injection PRN    [COMPLETED] ampicillin (Omnipen) 2 g injection        [] misoprostol (CYTOTEC) partial tablets 25 mcg  25 mcg Vaginal q4h    ampicillin (Omnipen) 2 g in sodium chloride 0.9% 100 mL IVPB-GENIA  2 g Intravenous Once    Followed by    ampicillin (Omnipen) 1 g in sodium chloride 0.9% 100mL IVPB-GENIA  1 g Intravenous Q4H   [2]   Facility-Administered Medications Prior to Admission   Medication Dose Route Frequency Provider Last Rate Last Admin    [COMPLETED] Rho D immune globulin (RhoGAM) IM injection 300 mcg  300 mcg Intramuscular Once    300 mcg at 25 0920     Medications Prior to Admission   Medication Sig Dispense Refill Last Dose/Taking    omeprazole 20 MG Oral Capsule Delayed Release Take 1 capsule (20 mg total) by mouth every morning before breakfast. 30 capsule 3 2025 at  9:00 AM    prenatal vitamin with DHA 27-0.8-228 MG Oral Cap Take 1 capsule by mouth in the morning.   2025 at  9:00 AM    aspirin 81 MG Oral Tab EC Take 1 tablet (81 mg total) by mouth in the morning.   2025 at  9:00 AM    [] Omeprazole 20 MG Oral Tablet Delayed Release Dispersible Take 1 tablet by mouth daily. 30 tablet 3    [3]   Past Surgical History:  Procedure Laterality Date    Umbilical hernia repair      x2 (, )   [4]   Social History  Socioeconomic History    Marital status: Single   Tobacco Use    Smoking status: Former     Current packs/day: 0.00     Types: Cigarettes     Quit date:      Years since quittin.2     Passive exposure: Never    Smokeless  tobacco: Never   Vaping Use    Vaping status: Never Used   Substance and Sexual Activity    Alcohol use: Not Currently    Drug use: Never    Sexual activity: Not Currently     Partners: Male   Other Topics Concern    Caffeine Concern Yes    Exercise No    Seat Belt Yes

## 2025-04-17 NOTE — PROGRESS NOTES
Vitals:    04/17/25 1712 04/17/25 1716 04/17/25 1717 04/17/25 1721   BP:  140/84  143/88   BP Location:  Left arm  Left arm   Pulse: 76 80 74 78   Resp:       Temp:       TempSrc:       SpO2: 96%  96%    Weight:       Height:           Latest Reference Range & Units 04/17/25 15:09   PT 11.6 - 14.8 seconds 14.3   INR 0.80 - 1.20  1.10   APTT 23.0 - 36.0 seconds 21.8 (L)   Fibrinogen 200 - 480 mg/dl 332   WBC 4.0 - 11.0 x10(3) uL 9.3   Hemoglobin 12.0 - 16.0 g/dL 8.2 (L)   Hematocrit 35.0 - 48.0 % 25.6 (L)   Platelet Count 150.0 - 450.0 10(3)uL 273.0     1 unit pRBC administered.  Suction turned off of Nancy around 1700, about 50cc in canister and tube.   Patient assessed at 1745 and bleeding has remained minimal.  Nancy removed and confirmed excellent uterine tone with minimal bleeding.    Caraballo catheter to be removed.  Ok for regular diet.    Patient to move to postpartum unit this evening.    Marlin Steele MD  [facility]  4/17/2025 5:58 PM

## 2025-04-18 LAB
GROUP B STREP BY PCR FOR PCR OVT: NEGATIVE
HCT VFR BLD AUTO: 23.9 % (ref 35–48)
HGB BLD-MCNC: 7.7 G/DL (ref 12–16)

## 2025-04-18 RX ORDER — CHOLECALCIFEROL (VITAMIN D3) 25 MCG
1 TABLET,CHEWABLE ORAL DAILY
Status: DISCONTINUED | OUTPATIENT
Start: 2025-04-18 | End: 2025-04-19

## 2025-04-18 RX ORDER — NIFEDIPINE 30 MG/1
30 TABLET, EXTENDED RELEASE ORAL EVERY 24 HOURS
Status: DISCONTINUED | OUTPATIENT
Start: 2025-04-18 | End: 2025-04-19

## 2025-04-18 RX ORDER — FUROSEMIDE 20 MG/1
20 TABLET ORAL ONCE
Status: COMPLETED | OUTPATIENT
Start: 2025-04-18 | End: 2025-04-18

## 2025-04-18 NOTE — PROGRESS NOTES
[facility]  Obstetrics and Gynecology   Postpartum Progress Note    Subjective:     Patient reports doing well. Baby is also doing well. Vaginal bleeding is minimal and improving. She is able to tolerate food and drink without nausea, abdominal pain, or GERD symptoms. She denies emotional concerns.     Review of Systems:  General:  denies fevers, chills, fatigue and malaise.   Respiratory:  denies SOB, dyspnea, cough or wheezing  Cardiovascular:  denies chest pain, palpitations, exercise intolerance   GI: denies abdominal pain, diarrhea, constipation  :  denies dysuria, hematuria, increased urinary frequency.  denies abnormal uterine bleeding or vaginal discharge.       Objective:     Vitals:    25 0204 25 0730 25 1100   BP: (!) 141/95 125/90 (!) 134/95 (!) 137/95   Pulse:  76 82 91   Resp: 18  18    Temp: 98.5 °F (36.9 °C)  98.4 °F (36.9 °C)    TempSrc: Oral  Oral    SpO2:   98%    Weight:       Height:             Body mass index is 37.42 kg/m².    General: AAO.NAD.   CVS exam: normal peripheral perfusion  Chest: non-labored breathing, no tachypnea   Abdominal exam: soft, nontender, nondistended  Pelvic exam:   VULVA: normal appearing vulva with no masses, tenderness or lesions  PERINEUM: intact, well healed, no signs of infection.   Ext: non-tender, no edema    Labs:  Lab Results   Component Value Date    WBC 10.9 2025    HGB 7.7 2025    HCT 23.9 2025    .0 2025    PTT 21.8 2025    INR 1.10 2025       Assessment/Plan:     Krista Collado is a 34 year old  female who is PPD#1 s/p  x2 of dichorionic-diamniotic twin gestation on  after IOL for preeclampsia without severe features.      Maternal conditions  - Preeclampsia without severe features - p:c0.67, BMP mild range    Ordered Procardia 30 XL every day to start today .   - PPH - QBL 1400 mL, EBL 1900 mL. Transfused 1U PRBC, received cytotec 1000 mcg IA and  TXA 1 gm IV, s/p removal of Nancy device.    Hgb 9.1>8.2>1U PRBC>7.6 (post)>7.7 this AM    IV iron infusion x1   - IVF/surrogate pregnancy  - RH negative, s/p RhoGam     Postpartum exam   - doing well, no complaints   - no abnormal findings on physical exam   - may return to normal activity     Contraception counseling   - discussion held with patient about family planning and contraception  - pt wishes to defer discussion until in-office postpartum appointment    All of the findings and plan were discussed with the patient.  She notes understanding and agrees with the plan of care.  All questions were answered to the best of my ability at this time.     Plan for discharge tomorrow pending blood pressure management.     RTC for 1 week BP check and 6 wek postpartum appointment.     Johan Georges MD   [facility]         Note to patient and family   The 21st Century Cures Act makes medical notes available to patients in the interest of transparency.  However, please be advised that this is a medical document.  It is intended as [id number]-ra-[id number] communication.  It is written and medical language may contain abbreviations or verbiage that are technical and unfamiliar.  It may appear blunt or direct.  Medical documents are intended to carry relevant information, facts as evident, and the clinical opinion of the practitioner.

## 2025-04-18 NOTE — PLAN OF CARE
Problem: POSTPARTUM  Goal: Long Term Goal:Experiences normal postpartum course  Description: INTERVENTIONS:- Assess and monitor vital signs and lab values.- Assess fundus and lochia.- Provide ice/sitz baths for perineum discomfort.- Monitor healing of incision/episiotomy/laceration, and assess for signs and symptoms of infection and hematoma.- Assess bladder function and monitor for bladder distention.- Provide/instruct/assist with pericare as needed.- Provide VTE prophylaxis as needed.- Monitor bowel function.- Encourage ambulation and provide assistance as needed.- Assess and monitor emotional status and provide social service/psych resources as needed.- Utilize standard precautions and use personal protective equipment as indicated. Ensure aseptic care of all intravenous lines and invasive tubes/drains.- Obtain immunization and exposure to communicable diseases history.  Outcome: Progressing  Goal: Establishment of adequate milk supply with medication/procedure interruptions  Description: INTERVENTIONS:- Review techniques for milk expression (breast pumping). - Provide pumping equipment/supplies, instructions, and assistance until it is safe to breastfeed infant.  Outcome: Progressing  Goal: Experiences normal breast weaning course  Description: INTERVENTIONS:- Assess for and manage engorgement.- Instruct on breast care.- Provide comfort measures.  Outcome: Progressing

## 2025-04-18 NOTE — PROGRESS NOTES
Patient transferred to mother/baby room 2196 per w/c   in stable condition with personal belongings.  Accompanied by  and staff.  Report given to mother/baby RN.

## 2025-04-18 NOTE — PROGRESS NOTES
Labor Analgesia Follow Up Note    Patient underwent epidural anesthesia for labor analgesia,    Placenta Date/Time:      ( 1)   2025  2:17 PM      ([place] 2)   2025  2:17 PM    Delivery Date/Time::      ( 1)   2025      ( 2)   2025       ([place] 1)   2:01 PM      ( 2)   2:10 PM    BP (!) 134/95 (BP Location: Left arm)   Pulse 82   Temp 98.4 °F (36.9 °C) (Oral)   Resp 18   Ht 1.626 m (5' 4\")   Wt 98.9 kg (218 lb)   LMP 2024 (Approximate)   SpO2 98%   Breastfeeding Yes   BMI 37.42 kg/m²     Assessment:  Patient seen and no apparent anesthesia related complications.    Thank you for asking us to participate in the care of your patient.

## 2025-04-18 NOTE — TELEPHONE ENCOUNTER
Forms completed and faxed to [facility] at [phone],Efax confirmation received. [employer] message sent to patient.

## 2025-04-18 NOTE — PROGRESS NOTES
Admitted to mother/baby room 2196 in stable condition. Postpartum education initiated. Bed in low position, call light in reach. Instructed pt to call for assistance when getting out of bed. Updated on plan of care. Voiced understanding.

## 2025-04-19 VITALS
WEIGHT: 218 LBS | OXYGEN SATURATION: 98 % | RESPIRATION RATE: 16 BRPM | HEART RATE: 92 BPM | HEIGHT: 64 IN | BODY MASS INDEX: 37.22 KG/M2 | DIASTOLIC BLOOD PRESSURE: 95 MMHG | TEMPERATURE: 98 F | SYSTOLIC BLOOD PRESSURE: 139 MMHG

## 2025-04-19 LAB
ALBUMIN SERPL-MCNC: 3 G/DL (ref 3.2–4.8)
ALBUMIN/GLOB SERPL: 1.6 {RATIO} (ref 1–2)
ALP LIVER SERPL-CCNC: 161 U/L (ref 37–98)
ALT SERPL-CCNC: 9 U/L (ref 10–49)
ANION GAP SERPL CALC-SCNC: 10 MMOL/L (ref 0–18)
AST SERPL-CCNC: 16 U/L (ref ?–34)
BASOPHILS # BLD AUTO: 0.04 X10(3) UL (ref 0–0.2)
BASOPHILS NFR BLD AUTO: 0.5 %
BILIRUB SERPL-MCNC: 0.2 MG/DL (ref 0.3–1.2)
BLOOD TYPE BARCODE: 9500
BUN BLD-MCNC: 8 MG/DL (ref 9–23)
CALCIUM BLD-MCNC: 8.5 MG/DL (ref 8.7–10.6)
CHLORIDE SERPL-SCNC: 109 MMOL/L (ref 98–112)
CO2 SERPL-SCNC: 23 MMOL/L (ref 21–32)
CREAT BLD-MCNC: 0.66 MG/DL (ref 0.55–1.02)
EGFRCR SERPLBLD CKD-EPI 2021: 117 ML/MIN/1.73M2 (ref 60–?)
EOSINOPHIL # BLD AUTO: 0.22 X10(3) UL (ref 0–0.7)
EOSINOPHIL NFR BLD AUTO: 2.6 %
ERYTHROCYTE [DISTWIDTH] IN BLOOD BY AUTOMATED COUNT: 14.9 %
GLOBULIN PLAS-MCNC: 1.9 G/DL (ref 2–3.5)
GLUCOSE BLD-MCNC: 94 MG/DL (ref 70–99)
HCT VFR BLD AUTO: 22.6 % (ref 35–48)
HGB BLD-MCNC: 7.4 G/DL (ref 12–16)
IMM GRANULOCYTES # BLD AUTO: 0.26 X10(3) UL (ref 0–1)
IMM GRANULOCYTES NFR BLD: 3 %
LYMPHOCYTES # BLD AUTO: 1.02 X10(3) UL (ref 1–4)
LYMPHOCYTES NFR BLD AUTO: 11.9 %
MCH RBC QN AUTO: 26.3 PG (ref 26–34)
MCHC RBC AUTO-ENTMCNC: 32.7 G/DL (ref 31–37)
MCV RBC AUTO: 80.4 FL (ref 80–100)
MONOCYTES # BLD AUTO: 0.84 X10(3) UL (ref 0.1–1)
MONOCYTES NFR BLD AUTO: 9.8 %
NEUTROPHILS # BLD AUTO: 6.22 X10 (3) UL (ref 1.5–7.7)
NEUTROPHILS # BLD AUTO: 6.22 X10(3) UL (ref 1.5–7.7)
NEUTROPHILS NFR BLD AUTO: 72.2 %
OSMOLALITY SERPL CALC.SUM OF ELEC: 292 MOSM/KG (ref 275–295)
PLATELET # BLD AUTO: 261 10(3)UL (ref 150–450)
POTASSIUM SERPL-SCNC: 3.7 MMOL/L (ref 3.5–5.1)
PROT SERPL-MCNC: 4.9 G/DL (ref 5.7–8.2)
RBC # BLD AUTO: 2.81 X10(6)UL (ref 3.8–5.3)
SODIUM SERPL-SCNC: 142 MMOL/L (ref 136–145)
UNIT VOLUME: 350 ML
WBC # BLD AUTO: 8.6 X10(3) UL (ref 4–11)

## 2025-04-19 RX ORDER — FUROSEMIDE 20 MG/1
20 TABLET ORAL DAILY
Qty: 3 TABLET | Refills: 0 | Status: SHIPPED | OUTPATIENT
Start: 2025-04-19 | End: 2025-04-25

## 2025-04-19 RX ORDER — ACETAMINOPHEN 500 MG
1000 TABLET ORAL EVERY 6 HOURS PRN
Qty: 60 TABLET | Refills: 0 | Status: SHIPPED | COMMUNITY
Start: 2025-04-19

## 2025-04-19 RX ORDER — NIFEDIPINE 30 MG
30 TABLET, EXTENDED RELEASE ORAL EVERY 24 HOURS
Qty: 30 TABLET | Refills: 0 | Status: SHIPPED | OUTPATIENT
Start: 2025-04-19 | End: 2025-04-25

## 2025-04-19 RX ORDER — FUROSEMIDE 20 MG/1
20 TABLET ORAL ONCE
Status: COMPLETED | OUTPATIENT
Start: 2025-04-19 | End: 2025-04-19

## 2025-04-19 RX ORDER — IBUPROFEN 600 MG/1
600 TABLET, FILM COATED ORAL EVERY 6 HOURS PRN
Qty: 30 TABLET | Refills: 0 | Status: SHIPPED | OUTPATIENT
Start: 2025-04-19

## 2025-04-19 NOTE — PLAN OF CARE
Problem: POSTPARTUM  Goal: Long Term Goal:Experiences normal postpartum course  Description: INTERVENTIONS:- Assess and monitor vital signs and lab values.- Assess fundus and lochia.- Provide ice/sitz baths for perineum discomfort.- Monitor healing of incision/episiotomy/laceration, and assess for signs and symptoms of infection and hematoma.- Assess bladder function and monitor for bladder distention.- Provide/instruct/assist with pericare as needed.- Provide VTE prophylaxis as needed.- Monitor bowel function.- Encourage ambulation and provide assistance as needed.- Assess and monitor emotional status and provide social service/psych resources as needed.- Utilize standard precautions and use personal protective equipment as indicated. Ensure aseptic care of all intravenous lines and invasive tubes/drains.- Obtain immunization and exposure to communicable diseases history.  Outcome: Completed  Goal: Establishment of adequate milk supply with medication/procedure interruptions  Description: INTERVENTIONS:- Review techniques for milk expression (breast pumping). - Provide pumping equipment/supplies, instructions, and assistance until it is safe to breastfeed infant.  Outcome: Completed  Goal: Experiences normal breast weaning course  Description: INTERVENTIONS:- Assess for and manage engorgement.- Instruct on breast care.- Provide comfort measures.  Outcome: Completed

## 2025-04-19 NOTE — PROGRESS NOTES
Discharge patient home as order. Teaching complete, patient feel comfortable in taking care of herself. Twins stay with biological parents. Discharge procedure complete at 1320.

## 2025-04-19 NOTE — DISCHARGE INSTRUCTIONS
Preeclampsia is a serious disease related to high blood pressure (hypertension).  Preeclampsia/hypertension can happen during pregnancy and up to 6 weeks following childbirth.  Contact your doctor or midwife immediately if you experience any of the following symptoms:  Headache that does not go away  Visual changes (seeing spots, blurred vision or partial vision loss)  Feeling nauseated or vomiting  Stomach pain/heartburn  Swelling in your hands, feet or face  Sudden weight gain  Shortness of breath  \"Just not feeling right\"  Post Vaginal Delivery Home Care Instructions     Krista-  We hope you were pleased with your care at [facility].  We wish you the best outcome and overall experience with the delivery of your baby.  These instructions will help to minimize pain, limit the risk for an infection, and improve the likelihood of a successful recovery.    What to Expect:  Abdominal cramping after delivery especially if you are breastfeeding   Vaginal bleeding for about 4-6 weeks that may be followed by a yellow or white discharge for a few more weeks.  Your period will resume in approximately 6-8 weeks, unless you are breastfeeding    If you are bottle feeding, you may notice breast engorgement in about 3 days.  Your breast may be sore and hard. Please wear a tight fitted bra or sports bra for 24-36 hours to help prevent your breast from producing milk, and use ice packs to relive any discomfort  If you are breastfeeding, nipple dryness is very common the first few days    Constipation is common after having a baby, please increase fluid and fiber in your diet     Over-The-Counter Medication  Non-prescription anti-inflammatory medications can also help to ease the pain.  You may take Aleve, Tylenol or Ibuprofen   Colace or Metamucil for Constipation  Lanolin for dry nipples  Tucks, Witch Hazel and Epifoam for Episiotomy discomfort   Drink a full glass of water with oral medication and take as directed    Wound  Care  The following instructions will promote proper healing and help to prevent infection  Episiotomy Care: Sitz Bath for 15mins, 2-3 times a day    Bathing/Showers  You may resume showers  No baths, swimming, hot tubs until your post-partum visit    Home Medication  Resume your home medications as instructed    Diet  Resume your normal diet    Activity  Refrain from vaginal intercourse, vaginal suppositories, tampon use or douches until after your post-partum visit  No exercising for 4 weeks  You may climb stairs minimally for the first week    Do not do heavy housework for at least 2-3 weeks    Return to Work or School  You may return to work in approximately 6 weeks  Contact your obstetrician’s office, if you need a medical release form/letter    Driving  Avoid driving if you are taking narcotics for pain relief    Follow-up Appointment with Your Obstetrician  Call your obstetrician’s office today for an appointment in 4-6 weeks    Verify your appointment date, day, time, and location  At your first post-partum office visit, your progress will be evaluated, findings reviewed, and any additional concerns and instructions will be discussed    Questions or Concerns  Call your obstetrician’s office if you experience the following:  Severe pain not controlled by pain medication  Foul smelling vaginal discharge  Heavy bleeding  Shortness of breath  Fever  Redness, increased swelling or drainage from your incision  Crying and periods of sadness that prevents you from caring for yourself and your baby  Burning sensation during urination or inability to urinate  Swelling, redness or abnormal warmth to your leg/calf  Please call . If your call is made after office hours, a physician will be available to help you.  There is always a provider covering our patients    Krista  Thank you for coming to [facility] to start your new family.  The nurses and the anesthesiologists try very hard to make sure you  receive the best care possible.  Your trust in them as well as us is greatly appreciated.    Thanks so much,   The [facility]

## 2025-04-19 NOTE — DISCHARGE SUMMARY
[facility]  Discharge Summary    Krista Collado Patient Status:  inpatient    1990 MRN [id number]   Location 2196/2196-A Attending [facility]   Hosp Day # 2 PCP No primary care provider on file.     Date of Admission: 2025    Date of Discharge: 25    Admitting Diagnosis: preganncy  Pregnancy (HCC)    Discharge Diagnosis:  s/p , PIH, PPH    Hospital Course: The patient is a 34 year old female now  who was admitted on  at 36wk1d with elevated blood pressures. She did meet criteria for preeclampsia without severe features. Due to di/di twin gestation, she was induced. Cervical ripening occurred.  Pitocin was initiated. Epidural was placed for pain management per patient request. The patient progressed to complete. S/p  on . Please refer to delivery note for further details. She did have a postpartum hemorrhage treated with uterotonics and then Nancy. She received 1u pRBC and IV iron. Discharge Hg 7.4. PIH labs wnl. Given lasix due to edema. Discharged on PO Procardia XL. By time of discharge on PPD#2, she was meeting all postpartum milestones.  She was discharged to home and instructed to follow up in 3 days for a BP check    Procedures:     Complications: PIH, PPH.     Discharge Condition: Stable    OBJECTIVE:    Vital signs in last 24 hours:  Temp:  [98 °F (36.7 °C)-98.4 °F (36.9 °C)] 98.4 °F (36.9 °C)  Pulse:  [] 84  Resp:  [17-18] 17  BP: (117-142)/(81-98) 131/91    Input/Output:  No intake or output data in the 24 hours ending 25 0933    Gen: alert, normal affect, no apparent distress   CV: RRR, no murmurs/rubs/gallops; 2+ edema bilaterally   Resp: lungs CTA bilaterally, no rales/rhonchi/wheezes; normal effort   Abd: soft, nondistended, appropriately tender, normal active bowel sounds; fundus firm and at umbilicus     Data Reviewed:  Recent Labs   Lab 25  1509 25  2033 25  0656 25  0736   RBC 3.16* 2.98*  --  2.81*   HGB 8.2* 7.6*  7.7* 7.4*   HCT 25.6* 24.5* 23.9* 22.6*   MCV 81.0 82.2  --  80.4   MCH 25.9* 25.5*  --  26.3   MCHC 32.0 31.0  --  32.7   RDW 14.6 14.9  --  14.9   NEPRELIM 7.65 9.21*  --  6.22   WBC 9.3 10.9  --  8.6   .0 241.0  --  261.0     Rubella IgG   Date Value Ref Range Status   11/07/2024 Positive Positive Final     Comment:     Rubella IgG Result Interpretation    Negative   <5.0 IU/mL  Equivocal  >= 5.0 - 9.9 IU/mL  Positive:  >= 10.0 IU/mL           Discharge Medications: Current Discharge Medication List       Medication List        START taking these medications      acetaminophen 500 MG Tabs  Commonly known as: Tylenol Extra Strength  Take 2 tablets (1,000 mg total) by mouth every 6 (six) hours as needed.     furosemide 20 MG Tabs  Commonly known as: Lasix  Take 1 tablet (20 mg total) by mouth daily.     ibuprofen 600 MG Tabs  Commonly known as: Motrin  Take 1 tablet (600 mg total) by mouth every 6 (six) hours as needed for Pain.     NIFEdipine ER 30 MG Tb24  Commonly known as: Adalat CC  Take 1 tablet (30 mg total) by mouth daily.            CONTINUE taking these medications      prenatal vitamin with DHA 27-0.8-228 MG Caps            STOP taking these medications      omeprazole 20 MG Cpdr  Commonly known as: PriLOSEC     Omeprazole 20 MG Tbdd               Where to Get Your Medications        These medications were sent to [facility]/pharmacy #[id number] - [address] [phone], [phone]  [address] [place] [address]      Phone: [phone]   furosemide 20 MG Tabs  ibuprofen 600 MG Tabs  NIFEdipine ER 30 MG Tb24       Information about where to get these medications is not yet available    Ask your nurse or doctor about these medications  acetaminophen 500 MG Tabs           Follow up Visits: Follow-up with [facility] in 3 days      Nilo Gutierrez MD  [facility] - OBGYN        Note to patient and family   The 21st Century Cures Act makes medical notes available to patients in the interest  of transparency.  However, please be advised that this is a medical document.  It is intended as [id number] communication.  It is written and medical language may contain abbreviations or verbiage that are technical and unfamiliar.  It may appear blunt or direct.  Medical documents are intended to carry relevant information, facts as evident, and the clinical opinion of the practitioner.

## 2025-04-21 ENCOUNTER — TELEPHONE (OUTPATIENT)
Dept: OBGYN CLINIC | Facility: CLINIC | Age: 35
End: 2025-04-21

## 2025-04-21 NOTE — TELEPHONE ENCOUNTER
Patient calling back regarding PP blood pressure-the pt has BP PP visit scheduled but still has additional questions regarding her blood pressure and is asking for a nurse to call her back at the phone number on file.   Thank you.

## 2025-04-21 NOTE — TELEPHONE ENCOUNTER
Spoke to patient.  Patient reports BP reading of 142/102 at 11:30am.  Patient had just taken her daily Procardia at 11:30am so it was not fully effective yet.  Patient rested for 15 minutes and then checked her BP again several times- it was 136/93, 147/93, 135/98.  Patient denies any other symptoms or concerns.  Advised patient okay to continue to monitor and call with any changes.  Please call the office if you have consistent blood pressures greater than 150/100, have a persistent headache that is not improved with rest/fluids/food/over the counter pain medication, vision changes, pain in your right upper side, or unusual/new swelling.   Patient verbalized understanding.

## 2025-04-21 NOTE — TELEPHONE ENCOUNTER
S/P    Preeclampsia without severe features  Procardia 30 mg daily  Discharged - needs BP check 3 days after discharge    Spoke to patient.  Patient denies symptoms of preeclampsia.  Patient has not checked her blood pressure at home yet.  Please call the office if you have any blood pressures greater than 150/100, have a persistent headache that is not improved with rest/fluids/food/over the counter pain medication, vision changes, pain in your right upper side, or unusual/new swelling.   Patient verbalized understanding.   Patient states she will schedule her 6 week postpartum appointment when she is here for BP check.

## 2025-04-21 NOTE — TELEPHONE ENCOUNTER
Patient delivered vaginally twins on 04/17 and was advised by Dr. Dupree to follow up for an office visit a weeksfrom today (04/28).  It is not clear what type of appt is being requested.   Please confirm if this a nurse BP check is the appt that is needed on 04/28 and then a 6wk PP visit with Dr. Dupree.   Please advise what appts are needed.     Thank you.

## 2025-04-22 ENCOUNTER — PATIENT OUTREACH (OUTPATIENT)
Dept: CASE MANAGEMENT | Age: 35
End: 2025-04-22

## 2025-04-22 ENCOUNTER — NURSE ONLY (OUTPATIENT)
Dept: OBGYN CLINIC | Facility: CLINIC | Age: 35
End: 2025-04-22
Payer: COMMERCIAL

## 2025-04-22 ENCOUNTER — HOSPITAL ENCOUNTER (INPATIENT)
Facility: HOSPITAL | Age: 35
LOS: 3 days | Discharge: HOME | End: 2025-04-25
Attending: STUDENT IN AN ORGANIZED HEALTH CARE EDUCATION/TRAINING PROGRAM | Admitting: OBSTETRICS & GYNECOLOGY
Payer: COMMERCIAL

## 2025-04-22 VITALS
HEART RATE: 88 BPM | BODY MASS INDEX: 34 KG/M2 | DIASTOLIC BLOOD PRESSURE: 92 MMHG | WEIGHT: 200 LBS | SYSTOLIC BLOOD PRESSURE: 140 MMHG

## 2025-04-22 LAB
ALBUMIN SERPL-MCNC: 4.3 G/DL (ref 3.2–4.8)
ALBUMIN/GLOB SERPL: 1.9 {RATIO} (ref 1–2)
ALP LIVER SERPL-CCNC: 189 U/L (ref 37–98)
ALT SERPL-CCNC: 35 U/L (ref 10–49)
ANION GAP SERPL CALC-SCNC: 11 MMOL/L (ref 0–18)
APTT PPP: 23.5 SECONDS (ref 23–36)
AST SERPL-CCNC: 32 U/L (ref ?–34)
BASOPHILS # BLD AUTO: 0.04 X10(3) UL (ref 0–0.2)
BASOPHILS NFR BLD AUTO: 0.4 %
BILIRUB SERPL-MCNC: 0.2 MG/DL (ref 0.3–1.2)
BILIRUB UR QL STRIP.AUTO: NEGATIVE
BUN BLD-MCNC: 12 MG/DL (ref 9–23)
CALCIUM BLD-MCNC: 9.4 MG/DL (ref 8.7–10.6)
CHLORIDE SERPL-SCNC: 104 MMOL/L (ref 98–112)
CLARITY UR REFRACT.AUTO: CLEAR
CO2 SERPL-SCNC: 25 MMOL/L (ref 21–32)
COLOR UR AUTO: COLORLESS
CREAT BLD-MCNC: 0.78 MG/DL (ref 0.55–1.02)
EGFRCR SERPLBLD CKD-EPI 2021: 101 ML/MIN/1.73M2 (ref 60–?)
EOSINOPHIL # BLD AUTO: 0.16 X10(3) UL (ref 0–0.7)
EOSINOPHIL NFR BLD AUTO: 1.7 %
ERYTHROCYTE [DISTWIDTH] IN BLOOD BY AUTOMATED COUNT: 17.1 %
FIBRINOGEN PPP-MCNC: 427 MG/DL (ref 200–480)
GLOBULIN PLAS-MCNC: 2.3 G/DL (ref 2–3.5)
GLUCOSE BLD-MCNC: 72 MG/DL (ref 70–99)
GLUCOSE UR STRIP.AUTO-MCNC: NORMAL MG/DL
HCT VFR BLD AUTO: 28.8 % (ref 35–48)
HGB BLD-MCNC: 9.1 G/DL (ref 12–16)
IMM GRANULOCYTES # BLD AUTO: 0.28 X10(3) UL (ref 0–1)
IMM GRANULOCYTES NFR BLD: 3 %
INR BLD: 0.97 (ref 0.8–1.2)
KETONES UR STRIP.AUTO-MCNC: NEGATIVE MG/DL
LEUKOCYTE ESTERASE UR QL STRIP.AUTO: NEGATIVE
LYMPHOCYTES # BLD AUTO: 1.39 X10(3) UL (ref 1–4)
LYMPHOCYTES NFR BLD AUTO: 15.1 %
MCH RBC QN AUTO: 26.1 PG (ref 26–34)
MCHC RBC AUTO-ENTMCNC: 31.6 G/DL (ref 31–37)
MCV RBC AUTO: 82.5 FL (ref 80–100)
MONOCYTES # BLD AUTO: 0.77 X10(3) UL (ref 0.1–1)
MONOCYTES NFR BLD AUTO: 8.4 %
NEUTROPHILS # BLD AUTO: 6.56 X10 (3) UL (ref 1.5–7.7)
NEUTROPHILS # BLD AUTO: 6.56 X10(3) UL (ref 1.5–7.7)
NEUTROPHILS NFR BLD AUTO: 71.4 %
NITRITE UR QL STRIP.AUTO: NEGATIVE
OSMOLALITY SERPL CALC.SUM OF ELEC: 288 MOSM/KG (ref 275–295)
PH UR STRIP.AUTO: 7.5 [PH] (ref 5–8)
PLATELET # BLD AUTO: 439 10(3)UL (ref 150–450)
POTASSIUM SERPL-SCNC: 3.8 MMOL/L (ref 3.5–5.1)
PROT SERPL-MCNC: 6.6 G/DL (ref 5.7–8.2)
PROT UR STRIP.AUTO-MCNC: NEGATIVE MG/DL
PROTHROMBIN TIME: 13 SECONDS (ref 11.6–14.8)
RBC # BLD AUTO: 3.49 X10(6)UL (ref 3.8–5.3)
SODIUM SERPL-SCNC: 140 MMOL/L (ref 136–145)
SP GR UR STRIP.AUTO: 1.01 (ref 1–1.03)
URATE SERPL-MCNC: 6.5 MG/DL (ref 3.1–7.8)
UROBILINOGEN UR STRIP.AUTO-MCNC: NORMAL MG/DL
WBC # BLD AUTO: 9.2 X10(3) UL (ref 4–11)

## 2025-04-22 PROCEDURE — 99221 1ST HOSP IP/OBS SF/LOW 40: CPT | Performed by: OBSTETRICS & GYNECOLOGY

## 2025-04-22 RX ORDER — LABETALOL HYDROCHLORIDE 5 MG/ML
40 INJECTION, SOLUTION INTRAVENOUS ONCE AS NEEDED
Status: DISCONTINUED | OUTPATIENT
Start: 2025-04-22 | End: 2025-04-25

## 2025-04-22 RX ORDER — LABETALOL HYDROCHLORIDE 5 MG/ML
80 INJECTION, SOLUTION INTRAVENOUS ONCE AS NEEDED
Status: DISCONTINUED | OUTPATIENT
Start: 2025-04-22 | End: 2025-04-25

## 2025-04-22 RX ORDER — LABETALOL HYDROCHLORIDE 5 MG/ML
20 INJECTION, SOLUTION INTRAVENOUS ONCE AS NEEDED
Status: DISCONTINUED | OUTPATIENT
Start: 2025-04-22 | End: 2025-04-25

## 2025-04-22 RX ORDER — LABETALOL 200 MG/1
200 TABLET, FILM COATED ORAL EVERY 12 HOURS SCHEDULED
Status: DISCONTINUED | OUTPATIENT
Start: 2025-04-22 | End: 2025-04-24

## 2025-04-22 RX ORDER — HYDRALAZINE HYDROCHLORIDE 20 MG/ML
10 INJECTION INTRAMUSCULAR; INTRAVENOUS ONCE AS NEEDED
Status: DISCONTINUED | OUTPATIENT
Start: 2025-04-22 | End: 2025-04-25

## 2025-04-22 RX ORDER — SODIUM CHLORIDE, SODIUM LACTATE, POTASSIUM CHLORIDE, CALCIUM CHLORIDE 600; 310; 30; 20 MG/100ML; MG/100ML; MG/100ML; MG/100ML
INJECTION, SOLUTION INTRAVENOUS CONTINUOUS
Status: DISCONTINUED | OUTPATIENT
Start: 2025-04-22 | End: 2025-04-25

## 2025-04-22 RX ORDER — OMEPRAZOLE 20 MG/1
20 CAPSULE, DELAYED RELEASE ORAL
COMMUNITY
End: 2025-04-30

## 2025-04-22 RX ORDER — CALCIUM GLUCONATE 94 MG/ML
1 INJECTION, SOLUTION INTRAVENOUS ONCE AS NEEDED
Status: ACTIVE | OUTPATIENT
Start: 2025-04-22 | End: 2025-04-22

## 2025-04-22 NOTE — PROGRESS NOTES
Hospital Follow up for FAVIAN (Discharge 4/19 edw)     Marlin Bush; Follow up BP Check/6w p/p  [address] [address] [place] [id number]   [phone]   Delivery Date:4/17/2025  Delivery Type: Normal spontaneous vaginal delivery  Existing appt for BP Check 4/22@4pm  6w p/p declined will make at office     Confirmed with pt   Closing encounter

## 2025-04-22 NOTE — ED PROVIDER NOTES
History     Chief Complaint   Patient presents with    Hypertension       HPI    34 year old female here for elev BP.  Status post normal spontaneous vaginal delivery twins 4/17, complicated by preeclampsia without severe features on Procardia 30 mg daily.  Since about a week prior to delivery she has had bilateral lower extremity swelling which has not improved since onset.  She has had intermittent diffuse headaches.  No vision changes or chest pain or shortness of breath.  No orthopnea.  Pressures have been fluctuating in the 140s systolic at home.  Discussed with OB and they advised to come in for reassessment.            Past Medical History[1]    Past Surgical History[2]    No pertinent social history.                 Physical Exam     ED Triage Vitals [04/22/25 1704]   BP (!) 141/99   Pulse 93   Resp 20   Temp 98.7 °F (37.1 °C)   Temp src Oral   SpO2 99 %   O2 Device None (Room air)       Physical Exam  Constitutional:       General: She is not in acute distress.  Eyes:      Extraocular Movements: Extraocular movements intact.      Conjunctiva/sclera: Conjunctivae normal.      Pupils: Pupils are equal, round, and reactive to light.   Cardiovascular:      Rate and Rhythm: Normal rate and regular rhythm.      Pulses: Normal pulses.   Pulmonary:      Effort: Pulmonary effort is normal. No respiratory distress.      Breath sounds: Normal breath sounds.   Abdominal:      General: Abdomen is flat. There is no distension.      Tenderness: There is no abdominal tenderness.   Musculoskeletal:      Cervical back: Normal range of motion.      Comments: 2+ pitting edema bilateral lower extremities   Skin:     General: Skin is warm.   Neurological:      General: No focal deficit present.      Mental Status: She is alert.      Sensory: No sensory deficit.      Motor: No weakness.              ED Course     Labs Reviewed   CBC WITH DIFFERENTIAL WITH PLATELET - Abnormal; Notable for the following components:       Result  Value    RBC 3.49 (*)     HGB 9.1 (*)     HCT 28.8 (*)     All other components within normal limits   COMP METABOLIC PANEL (14) - Abnormal; Notable for the following components:    Alkaline Phosphatase 189 (*)     Bilirubin, Total 0.2 (*)     All other components within normal limits   URIC ACID - Normal   PROTHROMBIN TIME (PT) - Normal   PTT, ACTIVATED - Normal   FIBRINOGEN ACTIVITY - Normal   URINALYSIS, ROUTINE     No results found.        MDM     Vitals:    04/22/25 1905 04/22/25 1915 04/22/25 1930 04/22/25 1945   BP: (!) 137/99 (!) 140/104 (!) 143/101 (!) 129/97   Pulse: 80 84 77 81   Resp: 17 13 13 17   Temp:       TempSrc:       SpO2: 97% 99% 100% 98%   Weight:           Preeclampsia without severe features.  Persistently high pressures.  Has some lower extremity swelling, no other signs of cardiomyopathy/heart failure.  Neurologically intact.  Fluctuating headaches with normal neurologic examination.    Blood pressure is elevated here, will initiate ED postpartum severe range blood pressure protocol with labetalol.  Will check labs to assess for organ injury    ED Course as of 04/22/25 2002  ------------------------------------------------------------  Time: 04/22 1910  Comment: Labs are reassuring without any evidence of severe features or endorgan damage.  After initial reading, blood pressure has been below 140 systolic without treatment.  ------------------------------------------------------------  Time: 04/22 1914  Comment: I discussed the case with OB/GYN, recommending admission for treatment with magnesium.  Patient remains comfortable and stable at this time.         Disposition and Plan     Clinical Impression:  1. Preeclampsia in postpartum period (HCC)        Disposition:  Admit    Follow-up:  No follow-up provider specified.    Medications Prescribed:  Current Discharge Medication List          Hospital Problems       Present on Admission  Date Reviewed: 4/17/2025          ICD-10-CM Noted POA     * (Principal) Preeclampsia in postpartum period ([facility]) O14.95 4/22/2025 Yes    Postpartum anemia ([facility]) O90.81 4/22/2025 Yes               [1]   Past Medical History:   Surrogate pregnancy ([facility])   [2]   Past Surgical History:  Procedure Laterality Date    Umbilical hernia repair      x2 (2014, 2022)

## 2025-04-22 NOTE — PROGRESS NOTES
Patient presents for BP check s/p  ..Twins 25.  Dx: Pre-eclampsia without sever features  Medication: Nifedipine ER 30 mg Daily    6 week PP appointment scheduled 25 Dr. Jimenez.    Pt. Is having headaches. Since being home .had a headache last night that rated \"7-8\" on pain scale was improved with tylenol, rest, fluids, food.  But also developed headache today as day is going on .  Pt. also has 2-3+ pedal edema along with elevated BP's  Pt. also states had some \"fuzz\" vision yesterday 1x only.  Denies N/V along with shortness of breath.  No epigastric pain or RUQ pain, but does have an occasional \"spasm-like\" pain in LUQ    Reports BP readings at home:  130's-140's over 80's-100     BP in office today: 140/92      Reviewed all information with Dr. Jimenez, provider in office today and she recommends that pt. go to ER to be evaluated for 2+-3+ pedal edema, headaches pt. Is having and elevated BP's    Patient verbalized understanding. of information and agrees to plan.

## 2025-04-22 NOTE — ED INITIAL ASSESSMENT (HPI)
34YF c/c of HTN Pt state that she delivered a baby 5 days ago went for follow up OB appointment and sent over for HTN Pt she having bilateral legs swelling and headache at this time

## 2025-04-23 LAB
ALBUMIN SERPL-MCNC: 3.8 G/DL (ref 3.2–4.8)
ALBUMIN/GLOB SERPL: 1.8 {RATIO} (ref 1–2)
ALP LIVER SERPL-CCNC: 176 U/L (ref 37–98)
ALT SERPL-CCNC: 32 U/L (ref 10–49)
ANION GAP SERPL CALC-SCNC: 10 MMOL/L (ref 0–18)
AST SERPL-CCNC: 28 U/L (ref ?–34)
BILIRUB SERPL-MCNC: 0.2 MG/DL (ref 0.3–1.2)
BUN BLD-MCNC: 7 MG/DL (ref 9–23)
CALCIUM BLD-MCNC: 7.9 MG/DL (ref 8.7–10.6)
CHLORIDE SERPL-SCNC: 106 MMOL/L (ref 98–112)
CO2 SERPL-SCNC: 25 MMOL/L (ref 21–32)
CREAT BLD-MCNC: 0.7 MG/DL (ref 0.55–1.02)
EGFRCR SERPLBLD CKD-EPI 2021: 115 ML/MIN/1.73M2 (ref 60–?)
ERYTHROCYTE [DISTWIDTH] IN BLOOD BY AUTOMATED COUNT: 17.1 %
GLOBULIN PLAS-MCNC: 2.1 G/DL (ref 2–3.5)
GLUCOSE BLD-MCNC: 91 MG/DL (ref 70–99)
HCT VFR BLD AUTO: 27.9 % (ref 35–48)
HGB BLD-MCNC: 8.7 G/DL (ref 12–16)
MAGNESIUM SERPL-MCNC: 6.1 MG/DL (ref 1.6–2.6)
MCH RBC QN AUTO: 26 PG (ref 26–34)
MCHC RBC AUTO-ENTMCNC: 31.2 G/DL (ref 31–37)
MCV RBC AUTO: 83.5 FL (ref 80–100)
OSMOLALITY SERPL CALC.SUM OF ELEC: 290 MOSM/KG (ref 275–295)
PLATELET # BLD AUTO: 376 10(3)UL (ref 150–450)
POTASSIUM SERPL-SCNC: 4 MMOL/L (ref 3.5–5.1)
PROT SERPL-MCNC: 5.9 G/DL (ref 5.7–8.2)
RBC # BLD AUTO: 3.34 X10(6)UL (ref 3.8–5.3)
SODIUM SERPL-SCNC: 141 MMOL/L (ref 136–145)
WBC # BLD AUTO: 8 X10(3) UL (ref 4–11)

## 2025-04-23 PROCEDURE — 99232 SBSQ HOSP IP/OBS MODERATE 35: CPT | Performed by: STUDENT IN AN ORGANIZED HEALTH CARE EDUCATION/TRAINING PROGRAM

## 2025-04-23 RX ORDER — ACETAMINOPHEN 500 MG
1000 TABLET ORAL EVERY 6 HOURS PRN
Status: DISCONTINUED | OUTPATIENT
Start: 2025-04-23 | End: 2025-04-25

## 2025-04-23 RX ORDER — FUROSEMIDE 20 MG/1
20 TABLET ORAL DAILY
Status: DISCONTINUED | OUTPATIENT
Start: 2025-04-23 | End: 2025-04-23

## 2025-04-23 RX ORDER — IBUPROFEN 600 MG/1
600 TABLET, FILM COATED ORAL EVERY 6 HOURS PRN
Status: DISCONTINUED | OUTPATIENT
Start: 2025-04-23 | End: 2025-04-25

## 2025-04-23 RX ORDER — DOCUSATE SODIUM 100 MG/1
100 CAPSULE, LIQUID FILLED ORAL 2 TIMES DAILY
Status: DISCONTINUED | OUTPATIENT
Start: 2025-04-23 | End: 2025-04-25

## 2025-04-23 RX ORDER — FERROUS SULFATE 325(65) MG
325 TABLET, DELAYED RELEASE (ENTERIC COATED) ORAL
Status: DISCONTINUED | OUTPATIENT
Start: 2025-04-23 | End: 2025-04-25

## 2025-04-23 RX ORDER — IBUPROFEN 400 MG/1
400 TABLET, FILM COATED ORAL EVERY 6 HOURS PRN
Status: DISCONTINUED | OUTPATIENT
Start: 2025-04-23 | End: 2025-04-23

## 2025-04-23 NOTE — PROGRESS NOTES
PT is  female admitted to 1107. Patient represents with postpartum hypertension for treatment with magnesium sulfate     PT is 5 days postpartum, delivered on 2025, .  History obtained, consents signed. Oriented to room, staff and plan of care.

## 2025-04-23 NOTE — H&P
[facility]  Obstetrics and Gynecology  History & Physical    Krista Collado Patient Status:  Emergency    1990 MRN [id number]   [facility] EMERGENCY DEPARTMENT Attending Nikolay Orellana MD   Hospital Day 0 PCP No primary care provider on file.     History of Present Illness:   Krista Collado is a 34 year old female  Patient's last menstrual period was 2024 (approximate). who presents 5 days postpartum from twin vaginal delivery with preeclampsia sent from office after blood pressure check due to complaint of intermittent headache since delivery and persistent severe swelling.  No epigastric pain or visual changes.  Surrogate mother and babies doing well with surrogate parents.  Blood pressures have been running 130-140's systolic at home on Nifedipine ER 30 mg daily, last taken at 11:30 am.  Patient was diagnosed with preeclampsia on admission 25 and never had severe range blood pressures and not treated with magnesium.  She did receive lasix x 5 days.    Antepartum Complications: hx IVF, depression    OB History    Para Term  AB Living   3 3 2 1  4   SAB IAB Ectopic Multiple Live Births      1 4      # Outcome Date GA Lbr Kelvin/2nd Weight Sex Type Anes PTL Lv   3A  25 36w2d 05:06 / 00:14 5 lb 7.5 oz (2.48 kg) M NORMAL SPONT EPI Y MATEUSZ      Complications: Other - see comments   3B  25 36w2d 05:06 / 00:23 5 lb 12.8 oz (2.63 kg) F NORMAL SPONT EPI Y MATEUSZ      Complications: Other - see comments   2 Term 16 40w0d  8 lb 1 oz (3.657 kg) F Vag-Spont EPI  MATEUSZ   1 Term 14 40w0d  8 lb (3.629 kg) F Vag-Spont EPI  MATEUSZ     Past Medical History[1]  Past Surgical History[2]    Medications:  Prescriptions Prior to Admission[3]    Allergies:  Allergies[4]     Social History:  Social History     Tobacco Use    Smoking status: Former     Current packs/day: 0.00     Types: Cigarettes     Quit date:      Years since quitting:  12.3     Passive exposure: Never    Smokeless tobacco: Never   Substance Use Topics    Alcohol use: Not Currently      Review of Systems:  Pertinent items noted in HPI    OBJECTIVE:     Vitals:    25 1915   BP: (!) 140/104   Pulse: 84   Resp: 13   Temp:      Physical Examination:  General appearance: Well dressed, well nourished in no apparent distress  Neurologic/Psychiatric: Alert and oriented to person, place and time, mood normal, affect appropriate  Abdomen- Soft, non-tender, non-distended, uterus firm, appropriate size  Extremities: 2+ dependent edema bilaterally, non-tender    Diagnostics:  Preeclamptic Panel   Uric acid 6.5  Recent Labs   Lab 25  1808   WBC 9.2   HGB 9.1*   .0   NE 6.56     Recent Labs   Lab 25  2032 25  0743 25  1808   GLU 89 94 72   BUN 8* 8* 12   CREATSERUM 0.75 0.66 0.78   EGFRCR 106 117 101   CA 8.7 8.5* 9.4   ALB 3.8 3.0* 4.3    142 140   K 4.4 3.7 3.8    109 104   CO2 20.0* 23.0 25.0   ALKPHO 203* 161* 189*   AST 25 16 32   ALT 12 9* 35   BILT 0.2* 0.2* 0.2*   TP 6.2 4.9* 6.6      ASSESSMENT/PLAN:   34 year old female  who presents postpartum day #5 from surrogate twin vaginal delivery with hx of intrapartum preeclampsia complaining of intermittent headache and severe swelling since delivery    1. Postpartum pre eclampsia with severe features:  - Repeat labs in AM  - Magnesium sulfate for 24 hours  - Magnesium serum in AM  - Monitor for s/s of magnesium toxicity  - Strict I/O  - General diet   - IVF per protocol while on magnesium sulfate   - BP monitoring  - Severe range BP protocol if indicated   - Will switch from Nifedipine ER to Labetalol 200 mg PO BID in case that is causing her headaches.  - Bedrest    2. Anemia  - Venofer x 1    Problem List[5]    Risks, benefits, alternatives and possible complications have been discussed in detail with the patient.  Pre-admission, admission, and post admission procedures and  expectations were discussed in detail.  All questions answered, all appropriate consents to be signed at the hospital.    Nikolay Orellana MD  [facility]- Obstetrics & Gynecology            [1]   Past Medical History:   Surrogate pregnancy ([facility])   [2]   Past Surgical History:  Procedure Laterality Date    Umbilical hernia repair      x2 (, )   [3] (Not in a hospital admission)  [4] No Known Allergies  [5]   Patient Active Problem List  Diagnosis    Surrogate pregnancy ([facility])    Pregnancy resulting from in vitro fertilization, antepartum ([facility])    Antepartum multigravida of advanced maternal age ([facility])    Obesity affecting pregnancy, antepartum ([facility])    Dichorionic diamniotic twin pregnancy, antepartum ([facility])    Nausea and vomiting in pregnancy ([facility])    Rh negative state in antepartum period ([facility])    Major depressive disorder, single episode, severe without psychotic features ([facility])    Abnormal glucose tolerance test in pregnancy ([facility])    Supervision of high risk pregnancy, antepartum ([facility])    Mild preeclampsia, third trimester ([facility])    Pregnancy ([facility])    Postpartum hemorrhage ([facility])     (spontaneous vaginal delivery) ([facility])     delivery ([facility])    Preeclampsia in postpartum period ([facility])

## 2025-04-23 NOTE — PROGRESS NOTES
[facility]  Obstetrics and Gynecology    OB/GYN: Postpartum Readmit Progress Note     SUBJECTIVE:  Patient is a 34 year old  female who is s/p  for di-di twins on 2025. She is a readmit for pp preeclampsia with severe features (by symptoms). She is PPD# 6.     Pt resting in bed, still endorses mild HA with minimal improvement with oral pain meds. IV Magnesium running. She denies vision changes, RUQ pain, acute onset N/V, SOB or CP, or worsening edema. Bleeding minimal. Caraballo catheter in place, UO adequate. Tolerating CLD. Has not ambulated since Magnesium initiation.     OBJECTIVE:  Vitals:    25 0800 25 0900 25 1000 25 1100   BP: (!) 131/93 113/82 114/84 112/79   Pulse: 96 82 80 83   Resp:       Temp:       TempSrc:       SpO2: 96% 94% 97% 99%   Weight:           Physical Exam:    General:    alert, appears stated age, cooperative, fatigued, and no distress   Lochia:  appropriate   Uterine Fundus:   firm below umbilicus   Perineum:   deferred     DVT Evaluation:  No evidence of DVT seen on physical exam.  +1 pitting edema     Urinary output is adequate.   I/O last 3 completed shifts:  In: 979.2 [I.V.:979.2]  Out: 2400 [Urine:2400]      Labs:  Recent Labs   Lab 25  18025  0700   RBC 3.49* 3.34*   HGB 9.1* 8.7*   HCT 28.8* 27.9*   MCV 82.5 83.5   MCH 26.1 26.0   MCHC 31.6 31.2   RDW 17.1 17.1   NEPRELIM 6.56  --    WBC 9.2 8.0   .0 376.0     Recent Labs   Lab 25  0743 25  1808 25  0700   GLU 94 72 91   BUN 8* 12 7*   CREATSERUM 0.66 0.78 0.70   EGFRCR 117 101 115   CA 8.5* 9.4 7.9*   ALB 3.0* 4.3 3.8    140 141   K 3.7 3.8 4.0    104 106   CO2 23.0 25.0 25.0   ALKPHO 161* 189* 176*   AST 16 32 28   ALT 9* 35 32   BILT 0.2* 0.2* 0.2*   TP 4.9* 6.6 5.9     Magnesium: 6.1 (2025)    ASSESSMENT/PLAN:  Patient is a 34 year old  female who is s/p  on 2025 for di-di twins, re-admitted for postpartum  preeclampsia with severe features by symptoms, PPD# 6.     1. Postpartum pre eclampsia with severe features:  - Repeat PIH labs stable and wnl, mag level at therapeutic range  - Continue IV Magnesium sulfate x24 hours (due off 4/23 at 2100)  - Monitor for s/s of magnesium toxicity  - Strict I/O  - CLD, ADAT  - IVF per protocol while on magnesium sulfate   - BP monitoring  - Severe range BP protocol if indicated   - Discontinued PO Nifedipine 30 mg XL -> continue PO Labetalol 200 mg BID for Bp control  - Bedrest      2. Anemia  - s/p Venofer x 1  - AM Hgb 8.7, likely dilutional from IVF with Magnesium treatment  - will add PO iron for additional supplementation    Dispo: continue inpatient management    Margret Jimenez, DO  [facility] - OBGYN      Note to patient and family   The 21st Century Cures Act makes medical notes available to patients in the interest of transparency.  However, please be advised that this is a medical document.  It is intended as yyjl-ac-newg communication.  It is written and medical language may contain abbreviations or verbiage that are technical and unfamiliar.  It may appear blunt or direct.  Medical documents are intended to carry relevant information, facts as evident, and the clinical opinion of the practitioner.

## 2025-04-24 LAB
ALBUMIN SERPL-MCNC: 4 G/DL (ref 3.2–4.8)
ALBUMIN/GLOB SERPL: 1.8 {RATIO} (ref 1–2)
ALP LIVER SERPL-CCNC: 172 U/L (ref 37–98)
ALT SERPL-CCNC: 33 U/L (ref 10–49)
ANION GAP SERPL CALC-SCNC: 9 MMOL/L (ref 0–18)
AST SERPL-CCNC: 25 U/L (ref ?–34)
BASOPHILS # BLD AUTO: 0.05 X10(3) UL (ref 0–0.2)
BASOPHILS NFR BLD AUTO: 0.6 %
BILIRUB SERPL-MCNC: 0.2 MG/DL (ref 0.3–1.2)
BUN BLD-MCNC: 6 MG/DL (ref 9–23)
CALCIUM BLD-MCNC: 7.9 MG/DL (ref 8.7–10.6)
CHLORIDE SERPL-SCNC: 107 MMOL/L (ref 98–112)
CO2 SERPL-SCNC: 26 MMOL/L (ref 21–32)
CREAT BLD-MCNC: 0.87 MG/DL (ref 0.55–1.02)
EGFRCR SERPLBLD CKD-EPI 2021: 88 ML/MIN/1.73M2 (ref 60–?)
EOSINOPHIL # BLD AUTO: 0.2 X10(3) UL (ref 0–0.7)
EOSINOPHIL NFR BLD AUTO: 2.5 %
ERYTHROCYTE [DISTWIDTH] IN BLOOD BY AUTOMATED COUNT: 17.9 %
GLOBULIN PLAS-MCNC: 2.2 G/DL (ref 2–3.5)
GLUCOSE BLD-MCNC: 78 MG/DL (ref 70–99)
HCT VFR BLD AUTO: 28.7 % (ref 35–48)
HGB BLD-MCNC: 8.7 G/DL (ref 12–16)
IMM GRANULOCYTES # BLD AUTO: 0.11 X10(3) UL (ref 0–1)
IMM GRANULOCYTES NFR BLD: 1.4 %
LYMPHOCYTES # BLD AUTO: 0.86 X10(3) UL (ref 1–4)
LYMPHOCYTES NFR BLD AUTO: 10.9 %
MAGNESIUM SERPL-MCNC: 3.4 MG/DL (ref 1.6–2.6)
MCH RBC QN AUTO: 26 PG (ref 26–34)
MCHC RBC AUTO-ENTMCNC: 30.3 G/DL (ref 31–37)
MCV RBC AUTO: 85.9 FL (ref 80–100)
MONOCYTES # BLD AUTO: 0.58 X10(3) UL (ref 0.1–1)
MONOCYTES NFR BLD AUTO: 7.3 %
NEUTROPHILS # BLD AUTO: 6.12 X10 (3) UL (ref 1.5–7.7)
NEUTROPHILS # BLD AUTO: 6.12 X10(3) UL (ref 1.5–7.7)
NEUTROPHILS NFR BLD AUTO: 77.3 %
OSMOLALITY SERPL CALC.SUM OF ELEC: 290 MOSM/KG (ref 275–295)
PLATELET # BLD AUTO: 401 10(3)UL (ref 150–450)
POTASSIUM SERPL-SCNC: 4.4 MMOL/L (ref 3.5–5.1)
PROT SERPL-MCNC: 6.2 G/DL (ref 5.7–8.2)
RBC # BLD AUTO: 3.34 X10(6)UL (ref 3.8–5.3)
SODIUM SERPL-SCNC: 142 MMOL/L (ref 136–145)
WBC # BLD AUTO: 7.9 X10(3) UL (ref 4–11)

## 2025-04-24 PROCEDURE — 99233 SBSQ HOSP IP/OBS HIGH 50: CPT | Performed by: OBSTETRICS & GYNECOLOGY

## 2025-04-24 RX ORDER — LABETALOL 200 MG/1
400 TABLET, FILM COATED ORAL EVERY 12 HOURS SCHEDULED
Status: DISCONTINUED | OUTPATIENT
Start: 2025-04-24 | End: 2025-04-24

## 2025-04-24 RX ORDER — LABETALOL 200 MG/1
400 TABLET, FILM COATED ORAL EVERY 8 HOURS SCHEDULED
Status: DISCONTINUED | OUTPATIENT
Start: 2025-04-24 | End: 2025-04-25

## 2025-04-24 NOTE — PROGRESS NOTES
[facility]  OB/GYN: Progress Note     SUBJECTIVE:  Pt is a 34 year old  female at 36w2d who was admitted on 2025 for postpartum preeclampsia with severe features. She is s/p  for di-di twins on 2025 and PPD#7.     Doing well this morning. Denies N, V, Ha, vision changes and RUQ/Epigastric pain.       OBJECTIVE:  Vital signs in last 24 hours:  Temp:  [98.4 °F (36.9 °C)] 98.4 °F (36.9 °C)  Pulse:  [66-88] 71  Resp:  [16-20] 17  BP: (110-140)/(74-97) 140/97  SpO2:  [97 %-100 %] 98 %  Temps:   Temp Readings from Last 3 Encounters:   25 98.4 °F (36.9 °C) (Oral)   25 98.4 °F (36.9 °C) (Oral)     Maximum Temperature: Temp (24hrs), Av.4 °F (36.9 °C), Min:98.4 °F (36.9 °C), Max:98.4 °F (36.9 °C)     BPs:  BP Readings from Last 3 Encounters:   25 (!) 140/97   25 (!) 140/92   25 (!) 139/95     Weights:  Wt Readings from Last 3 Encounters:   25 200 lb (90.7 kg)   25 200 lb (90.7 kg)   25 218 lb (98.9 kg)       Intake/Output:  Totals for last 24 hours:     Intake/Output Summary (Last 24 hours) at 2025 1034  Last data filed at 2025 0530  Gross per 24 hour   Intake 2053.33 ml   Output 4190 ml   Net -2136.67 ml       General: AAO. NAD  CV: normal peripheral perfusion   Lungs: non-labored breathing, no tachypnea   Abdomen: soft, non-tender  Uterus: nontender  Extremities: SCDs in place, no calf tenderness bilaterally      Labs:  Lab Results   Component Value Date    WBC 7.9 2025    HGB 8.7 2025    HCT 28.7 2025    .0 2025    CREATSERUM 0.87 2025    BUN 6 2025     2025    K 4.4 2025     2025    CO2 26.0 2025    GLU 78 2025    CA 7.9 2025    ALB 4.0 2025    ALKPHO 172 2025    BILT 0.2 2025    TP 6.2 2025    AST 25 2025    ALT 33 2025    MG 3.4 2025   '    ASSESSMENT/PLAN:  Pt is a 34 year old  female at  36w2d who was admitted on 4/22/2025 for postpartum preeclampsia with severe features.   Hospital Day 2    Active Problems:  Problem List[1]    Postpartum preeclampsia with severe features  - Denies symptoms of preeclampsia with severe features at this time  - Status post magnesium sulfate for 24 hours for seizure prophylaxis  - Continue to monitor for signs and symptoms of preeclampsia with severe features  - Continue blood pressure monitoring  - Continue oral blood pressure medication.  Patient discontinued from nifedipine.  Patient initiated on labetalol 200 mg twice daily.  However, labetalol increased to 400 mg twice daily this morning.  Discussed with patient possible 3 times daily dosing.  Discussed with patient blood pressure management.  Recommend to continue inpatient monitoring for blood pressure management.  -The patient expressed a desire to return to home today.  However, patient encouraged to remain inpatient for blood pressure management.  Discussed with patient risk of elevated blood pressure associated with preeclampsia following the discontinuation of magnesium sulfate for seizure prophylaxis.  Patient agreeable to remain inpatient.  - Precautions provided.  Recommend follow-up in 3 to 4 days after discharge for blood pressure check.    Postpartum care  - Continue routine postpartum care    Total patient time was 50 minutes in evaluation, consultation, and coordination of care.  Greater than 50% of this time was spent in face to face discussion with the patient. The patient had many questions and concerns that were addressed.     Ana Grubbs MD   [facility] - OBGYN        Note to patient and family   The 21st Century Cures Act makes medical notes available to patients in the interest of transparency.  However, please be advised that this is a medical document.  It is intended as [id number]-ek-lvjk communication.  It is written and medical language may contain abbreviations or verbiage that are technical and  unfamiliar.  It may appear blunt or direct.  Medical documents are intended to carry relevant information, facts as evident, and the clinical opinion of the practitioner.                     [1]   Patient Active Problem List  Diagnosis    Surrogate pregnancy ([facility])    Pregnancy resulting from in vitro fertilization, antepartum ([facility])    Antepartum multigravida of advanced maternal age ([facility])    Obesity affecting pregnancy, antepartum ([facility])    Dichorionic diamniotic twin pregnancy, antepartum ([facility])    Nausea and vomiting in pregnancy ([facility])    Rh negative state in antepartum period ([facility])    Major depressive disorder, single episode, severe without psychotic features ([facility])    Abnormal glucose tolerance test in pregnancy ([facility])    Supervision of high risk pregnancy, antepartum ([facility])    Mild preeclampsia, third trimester ([facility])    Pregnancy ([facility])    Postpartum hemorrhage ([facility])     (spontaneous vaginal delivery) ([facility])     delivery ([facility])    Preeclampsia in postpartum period ([facility])    Postpartum anemia ([facility])

## 2025-04-24 NOTE — PLAN OF CARE
Problem: PAIN - ADULT  Goal: Verbalizes/displays adequate comfort level or patient's stated pain goal  Description: INTERVENTIONS:- Encourage pt to monitor pain and request assistance- Assess pain using appropriate pain scale- Administer analgesics based on type and severity of pain and evaluate response- Implement non-pharmacological measures as appropriate and evaluate response- Consider cultural and social influences on pain and pain management- Manage/alleviate anxiety- Utilize distraction and/or relaxation techniques- Monitor for opioid side effects- Notify MD/LIP if interventions unsuccessful or patient reports new pain- Anticipate increased pain with activity and pre-medicate as appropriate  Outcome: Progressing     Problem: RISK FOR INFECTION - ADULT  Goal: Absence of fever/infection during anticipated neutropenic period  Description: INTERVENTIONS- Monitor WBC- Administer growth factors as ordered- Implement neutropenic guidelines  Outcome: Progressing     Problem: SAFETY ADULT - FALL  Goal: Free from fall injury  Description: INTERVENTIONS:- Assess pt frequently for physical needs- Identify cognitive and physical deficits and behaviors that affect risk of falls.- [place] fall precautions as indicated by assessment.- Educate pt/family on patient safety including physical limitations- Instruct pt to call for assistance with activity based on assessment- Modify environment to reduce risk of injury- Provide assistive devices as appropriate- Consider OT/PT consult to assist with strengthening/mobility- Encourage toileting schedule  Outcome: Progressing     Problem: DISCHARGE PLANNING  Goal: Discharge to home or other facility with appropriate resources  Description: INTERVENTIONS:- Identify barriers to discharge w/pt and caregiver- Include patient/family/discharge partner in discharge planning- Arrange for needed discharge resources and transportation as appropriate- Identify discharge learning needs (meds,  wound care, etc)- Arrange for interpreters to assist at discharge as needed- Consider post-discharge preferences of patient/family/discharge partner- Complete POLST form as appropriate- Assess patient's ability to be responsible for managing their own health- Refer to Case Management Department for coordinating discharge planning if the patient needs post-hospital services based on physician/LIP order or complex needs related to functional status, cognitive ability or social support system  Outcome: Progressing

## 2025-04-25 ENCOUNTER — TELEPHONE (OUTPATIENT)
Dept: OBGYN CLINIC | Facility: CLINIC | Age: 35
End: 2025-04-25

## 2025-04-25 VITALS
DIASTOLIC BLOOD PRESSURE: 82 MMHG | SYSTOLIC BLOOD PRESSURE: 124 MMHG | TEMPERATURE: 98 F | HEART RATE: 80 BPM | OXYGEN SATURATION: 98 % | WEIGHT: 200 LBS | BODY MASS INDEX: 34 KG/M2 | RESPIRATION RATE: 18 BRPM

## 2025-04-25 PROCEDURE — 99238 HOSP IP/OBS DSCHRG MGMT 30/<: CPT | Performed by: STUDENT IN AN ORGANIZED HEALTH CARE EDUCATION/TRAINING PROGRAM

## 2025-04-25 RX ORDER — LABETALOL HYDROCHLORIDE 400 MG/1
400 TABLET, FILM COATED ORAL EVERY 8 HOURS SCHEDULED
Qty: 90 TABLET | Refills: 1 | Status: SHIPPED | OUTPATIENT
Start: 2025-04-25 | End: 2025-04-25

## 2025-04-25 RX ORDER — LABETALOL 200 MG/1
400 TABLET, FILM COATED ORAL 3 TIMES DAILY
Qty: 180 TABLET | Refills: 1 | Status: SHIPPED | OUTPATIENT
Start: 2025-04-25

## 2025-04-25 NOTE — TELEPHONE ENCOUNTER
Called and spoke with pt. And samantha andres F/U appointment. For Blood Pressure Check in [place] office for Tuesday 5/29/25 @ 10 am with nurses.    Pt. aware to keep blood pressure log and take bp BID,  Pt. being sent home on Labetalol 400 mg TID.  Pt. aware of ER precautions for elevated BP and pre-eclampsia symptoms   Pt. Verbalizes understanding of information and agrees to plan.

## 2025-04-25 NOTE — PLAN OF CARE
Problem: PAIN - ADULT  Goal: Verbalizes/displays adequate comfort level or patient's stated pain goal  Description: INTERVENTIONS:- Encourage pt to monitor pain and request assistance- Assess pain using appropriate pain scale- Administer analgesics based on type and severity of pain and evaluate response- Implement non-pharmacological measures as appropriate and evaluate response- Consider cultural and social influences on pain and pain management- Manage/alleviate anxiety- Utilize distraction and/or relaxation techniques- Monitor for opioid side effects- Notify MD/LIP if interventions unsuccessful or patient reports new pain- Anticipate increased pain with activity and pre-medicate as appropriate  Outcome: Completed

## 2025-04-25 NOTE — PLAN OF CARE
Problem: PAIN - ADULT  Goal: Verbalizes/displays adequate comfort level or patient's stated pain goal  Description: INTERVENTIONS:- Encourage pt to monitor pain and request assistance- Assess pain using appropriate pain scale- Administer analgesics based on type and severity of pain and evaluate response- Implement non-pharmacological measures as appropriate and evaluate response- Consider cultural and social influences on pain and pain management- Manage/alleviate anxiety- Utilize distraction and/or relaxation techniques- Monitor for opioid side effects- Notify MD/LIP if interventions unsuccessful or patient reports new pain- Anticipate increased pain with activity and pre-medicate as appropriate  Outcome: Progressing     Problem: RISK FOR INFECTION - ADULT  Goal: Absence of fever/infection during anticipated neutropenic period  Description: INTERVENTIONS- Monitor WBC- Administer growth factors as ordered- Implement neutropenic guidelines  Outcome: Progressing     Problem: SAFETY ADULT - FALL  Goal: Free from fall injury  Description: INTERVENTIONS:- Assess pt frequently for physical needs- Identify cognitive and physical deficits and behaviors that affect risk of falls.- [place] fall precautions as indicated by assessment.- Educate pt/family on patient safety including physical limitations- Instruct pt to call for assistance with activity based on assessment- Modify environment to reduce risk of injury- Provide assistive devices as appropriate- Consider OT/PT consult to assist with strengthening/mobility- Encourage toileting schedule  Outcome: Progressing     Problem: DISCHARGE PLANNING  Goal: Discharge to home or other facility with appropriate resources  Description: INTERVENTIONS:- Identify barriers to discharge w/pt and caregiver- Include patient/family/discharge partner in discharge planning- Arrange for needed discharge resources and transportation as appropriate- Identify discharge learning needs (meds,  wound care, etc)- Arrange for interpreters to assist at discharge as needed- Consider post-discharge preferences of patient/family/discharge partner- Complete POLST form as appropriate- Assess patient's ability to be responsible for managing their own health- Refer to Case Management Department for coordinating discharge planning if the patient needs post-hospital services based on physician/LIP order or complex needs related to functional status, cognitive ability or social support system  Outcome: Progressing     Problem: Patient/Family Goals  Goal: Patient/Family Long Term Goal  Description: Patient's Long Term Goal: safely discharge home   Interventions:- See additional Care Plan goals for specific interventions  Outcome: Progressing  Goal: Patient/Family Short Term Goal  Description: Patient's Short Term Goal: stable blood pressures  Interventions: - See additional Care Plan goals for specific interventions  Outcome: Progressing

## 2025-04-25 NOTE — TELEPHONE ENCOUNTER
----- Message from Nilo Gutierrez sent at 4/25/2025 11:04 AM CDT -----  Hello, pt is being discharged today. Needs blood pressure check within 1 wk.

## 2025-04-25 NOTE — PROGRESS NOTES
Report received from Stacey MAN. Pt denies CUNHA, vision changes, upper gastric pain. POC discussed. Call light within reach.

## 2025-04-25 NOTE — DISCHARGE INSTRUCTIONS
Preeclampsia is a serious disease related to high blood pressure (hypertension).  Preeclampsia/hypertension can happen during pregnancy and up to 6 weeks following childbirth.  Contact your doctor or midwife immediately if you experience any of the following symptoms:  Headache that does not go away  Visual changes (seeing spots, blurred vision or partial vision loss)  Feeling nauseated or vomiting  Stomach pain/heartburn  Worsening swelling in your hands, feet or face  Sudden weight gain  Shortness of breath  \"Just not feeling right\"   No

## 2025-04-25 NOTE — TELEPHONE ENCOUNTER
Patient called to speak with nurse regarding blood pressure medication. Per her pharmacy the MD needs to send request to insurance for approval for meds. Can you please contact her via cell phone #.

## 2025-04-25 NOTE — PROGRESS NOTES
Pt discharged home in stable condition with belongings and pumped breast milk. Instructions given pt verbalized understanding.

## 2025-04-25 NOTE — DISCHARGE SUMMARY
[facility]  Discharge Summary    Krista Collado Patient Status:  inpatient    1990 MRN [id number]   Location 1107/1107-A Attending [facility]   Hosp Day # 3 PCP No primary care provider on file.     Date of Admission: 2025    Date of Discharge: 25    Admitting Diagnosis: 34 year old  PPD#5 s/p , Preeclampsia in postpartum period (HCC) [O14.95]    Discharge Diagnosis: Same, now PPD#8    Hospital Course: The patient is a 34 year old female now  who was admitted on  on PPD#5 s/p  after reporting intermittent headaches and severe swelling. On prior admission she had met criteria for preeclampsia without severe features. This admission, she was diagnosed with preeclampsia with severe features. She received Magnesium x 24 hours. Blood pressure was stabilized with labetalol 400 TID. She was asymptomatic at time of discharge.       Procedures:     Complications: none    Discharge Condition: Stable    OBJECTIVE:    Vital signs in last 24 hours:  Temp:  [97.8 °F (36.6 °C)-98.8 °F (37.1 °C)] 97.8 °F (36.6 °C)  Pulse:  [70-80] 80  Resp:  [18-20] 18  BP: (120-147)/(82-92) 124/82    Input/Output:  No intake or output data in the 24 hours ending 25 1529    Gen: alert, normal affect, no apparent distress   CV: RRR, no murmurs/rubs/gallops; no edema bilaterally   Resp: lungs CTA bilaterally, no rales/rhonchi/wheezes; normal effort   Abd: soft, nondistended,fundus firm and below umbilicus     Data Reviewed:  Recent Labs   Lab 25  0736 25  1808 25  0700 25  0839   RBC 2.81* 3.49* 3.34* 3.34*   HGB 7.4* 9.1* 8.7* 8.7*   HCT 22.6* 28.8* 27.9* 28.7*   MCV 80.4 82.5 83.5 85.9   MCH 26.3 26.1 26.0 26.0   MCHC 32.7 31.6 31.2 30.3*   RDW 14.9 17.1 17.1 17.9   NEPRELIM 6.22 6.56  --  6.12   WBC 8.6 9.2 8.0 7.9   .0 439.0 376.0 401.0     Rubella IgG   Date Value Ref Range Status   2024 Positive Positive Final     Comment:     Rubella IgG Result  Interpretation    Negative   <5.0 IU/mL  Equivocal  >= 5.0 - 9.9 IU/mL  Positive:  >= 10.0 IU/mL           Discharge Medications: Current Discharge Medication List       Medication List        START taking these medications      Labetalol HCl 400 MG Tabs  Take 400 mg by mouth every 8 (eight) hours.            CONTINUE taking these medications      acetaminophen 500 MG Tabs  Commonly known as: Tylenol Extra Strength  Take 2 tablets (1,000 mg total) by mouth every 6 (six) hours as needed.     ibuprofen 600 MG Tabs  Commonly known as: Motrin  Take 1 tablet (600 mg total) by mouth every 6 (six) hours as needed for Pain.     omeprazole 20 MG Cpdr  Commonly known as: PriLOSEC     prenatal vitamin with DHA 27-0.8-228 MG Caps            STOP taking these medications      furosemide 20 MG Tabs  Commonly known as: Lasix     NIFEdipine ER 30 MG Tb24  Commonly known as: Adalat CC               Where to Get Your Medications        These medications were sent to [facility]/pharmacy #[id number] - [address] [phone], [phone]  [address] [place] [id number]      Phone: [phone]   Labetalol HCl 400 MG Tabs           Follow up Visits: Follow-up with [facility] in 6 weeks      Nilo Gutierrez MD  [facility] - [facility]        Note to patient and family   The 21st Century Cures Act makes medical notes available to patients in the interest of transparency.  However, please be advised that this is a medical document.  It is intended as cjqe-wc-xhph communication.  It is written and medical language may contain abbreviations or verbiage that are technical and unfamiliar.  It may appear blunt or direct.  Medical documents are intended to carry relevant information, facts as evident, and the clinical opinion of the practitioner.

## 2025-04-28 NOTE — TELEPHONE ENCOUNTER
Spoke to [facility] pharmacy.  Patient was able to  labetalol prescription on 4/25/25 without an issue.  The original prescription that was sent for 400 mg labetalol required authorization.  Authorization is no longer needed for the prescription written and filled for 200 mg.

## 2025-04-29 ENCOUNTER — NURSE ONLY (OUTPATIENT)
Dept: OBGYN CLINIC | Facility: CLINIC | Age: 35
End: 2025-04-29
Payer: COMMERCIAL

## 2025-04-29 VITALS
HEART RATE: 80 BPM | DIASTOLIC BLOOD PRESSURE: 78 MMHG | WEIGHT: 188 LBS | BODY MASS INDEX: 32 KG/M2 | SYSTOLIC BLOOD PRESSURE: 118 MMHG

## 2025-04-29 NOTE — PROGRESS NOTES
Patient presents for BP check s/p  25 Twins .  Re-admitted for elevated BP and treatment  Dx: Pre-Eclampsia with Sever Features   Medication: Labetalol 400 mg TID    6 week PP appointment scheduled 25 Dr. Jimenez .     Patient denies symptoms of preeclampsia (headache, vision changes, abdominal pain, Nausea, vomiting, shortness of breath, or swelling).  Pt. does c/o dizziness at times, fatigue, lightheadedness, ringing in ears at times     Reports BP readings at home:  113-139 over 67-92     BP in office today: 118/78      Reviewed all information with provider in office today, Dr. Jimenez.  She recommended to lower Labetalol to 200 mg BID now, continue to take blood pressure BID and send a BP log to office in one week for review by providers for further recommends/instructions.     Instructed patient to continue medication and monitor blood pressure twice daily and  call the office to report any blood pressures greater than 160/100 or lower than 110/60, a persistent headache (that is not improved with rest/fluids/food/over the counter pain medication), vision changes, Right upper quadrant pain, or unusual/new swelling.   Patient verbalized understanding.

## 2025-04-30 ENCOUNTER — PATIENT MESSAGE (OUTPATIENT)
Dept: OBGYN CLINIC | Facility: CLINIC | Age: 35
End: 2025-04-30

## 2025-04-30 NOTE — TELEPHONE ENCOUNTER
Pt. was here yesterday for her BP check and was requesting a refill for her Omeprazole 20 mg Daily.  Please advise We did order for her during her pregnancy  Have pended rx for you to sign if agrees.  Thanks

## 2025-05-01 RX ORDER — OMEPRAZOLE 20 MG/1
20 CAPSULE, DELAYED RELEASE ORAL
Qty: 90 CAPSULE | Refills: 0 | Status: SHIPPED | OUTPATIENT
Start: 2025-05-01

## 2025-05-02 ENCOUNTER — PATIENT MESSAGE (OUTPATIENT)
Dept: OBGYN CLINIC | Facility: CLINIC | Age: 35
End: 2025-05-02

## 2025-05-07 NOTE — TELEPHONE ENCOUNTER
25- s/p  Twins  25- 6 week PP appointment scheduled    Routed to provider for review  -BP log received  -Labetalol 200 mg BID (decreased on 2025, previously 400 mg TID)

## 2025-05-13 ENCOUNTER — PATIENT MESSAGE (OUTPATIENT)
Dept: OBGYN CLINIC | Facility: CLINIC | Age: 35
End: 2025-05-13

## 2025-05-14 RX ORDER — LABETALOL 200 MG/1
400 TABLET, FILM COATED ORAL 2 TIMES DAILY
Qty: 120 TABLET | Refills: 0 | Status: SHIPPED | OUTPATIENT
Start: 2025-05-14

## 2025-05-14 NOTE — TELEPHONE ENCOUNTER
Lab orders placed, per provider instruction.  Patient notified to complete labs, fasting required for CMP.  Increase Labetalol to 400 mg BID.  Patient reports taking 600 mg ibuprofen during both episodes, otherwise does not need pain medications.  ER precautions for pain, s/s of pre-eclampsia reviewed.  Patient verbalized understanding.          Margret Jimenez, DO to [facility]       5/14/25  1:50 PM  I'd advise increasing PO meds to 400mg BID of Labetalol plus outpatient preeclampsia labs (CBC, CMP, and PCR). I'd also want to know if she is taking any meds for pain. If pain returns and does not improve with pain meds, then I'd recommend inpatient evaluation with [facility].  Thanks!

## 2025-05-14 NOTE — TELEPHONE ENCOUNTER
2025 s/p , Twins  Pre-Eclampsia with Sever Features   Medication: Labetalol 200 mg BID     Reports 2 episodes of \"excruciating\" pain to upper right abdomen, underneath ribs.  Monday 3 am first painful episode, patient was woken up by intense sharp pain, lasted about 1.5 hour.    Blood pressures during episode: 137/94, 152/100, 167/116, 143/101, 145/91, 133/86  Tuesday, 2nd episode patient was sitting outside.  Blood pressures during episode: 157/98, 154/101, 154/96, 149/94, 146/89, 140/91  Otherwise, reports normal blood pressures and taking medication as prescribed.   Patient has scant postpartum bleeding occasionally.  Denies headaches, visual disturbance and no unusual swelling.  Sandro abdominal pain at this time.    Routed to provider for review/recommendations.  -ED/L&D evaluation?

## 2025-05-15 ENCOUNTER — LAB ENCOUNTER (OUTPATIENT)
Dept: LAB | Age: 35
End: 2025-05-15
Attending: STUDENT IN AN ORGANIZED HEALTH CARE EDUCATION/TRAINING PROGRAM
Payer: COMMERCIAL

## 2025-05-15 LAB
ALBUMIN SERPL-MCNC: 4.5 G/DL (ref 3.2–4.8)
ALBUMIN/GLOB SERPL: 2 {RATIO} (ref 1–2)
ALP LIVER SERPL-CCNC: 235 U/L (ref 37–98)
ALT SERPL-CCNC: 103 U/L (ref 10–49)
ANION GAP SERPL CALC-SCNC: 11 MMOL/L (ref 0–18)
AST SERPL-CCNC: 58 U/L (ref ?–34)
BASOPHILS # BLD AUTO: 0.04 X10(3) UL (ref 0–0.2)
BASOPHILS NFR BLD AUTO: 1 %
BILIRUB SERPL-MCNC: 0.3 MG/DL (ref 0.3–1.2)
BUN BLD-MCNC: 10 MG/DL (ref 9–23)
CALCIUM BLD-MCNC: 9.8 MG/DL (ref 8.7–10.6)
CHLORIDE SERPL-SCNC: 109 MMOL/L (ref 98–112)
CO2 SERPL-SCNC: 22 MMOL/L (ref 21–32)
CREAT BLD-MCNC: 0.97 MG/DL (ref 0.55–1.02)
CREAT UR-SCNC: 180.3 MG/DL
EGFRCR SERPLBLD CKD-EPI 2021: 77 ML/MIN/1.73M2 (ref 60–?)
EOSINOPHIL # BLD AUTO: 0.2 X10(3) UL (ref 0–0.7)
EOSINOPHIL NFR BLD AUTO: 5.2 %
ERYTHROCYTE [DISTWIDTH] IN BLOOD BY AUTOMATED COUNT: 17.4 %
FASTING STATUS PATIENT QL REPORTED: YES
GLOBULIN PLAS-MCNC: 2.2 G/DL (ref 2–3.5)
GLUCOSE BLD-MCNC: 85 MG/DL (ref 70–99)
HCT VFR BLD AUTO: 35.4 % (ref 35–48)
HGB BLD-MCNC: 10.4 G/DL (ref 12–16)
IMM GRANULOCYTES # BLD AUTO: 0.01 X10(3) UL (ref 0–1)
IMM GRANULOCYTES NFR BLD: 0.3 %
LYMPHOCYTES # BLD AUTO: 0.84 X10(3) UL (ref 1–4)
LYMPHOCYTES NFR BLD AUTO: 21.6 %
MCH RBC QN AUTO: 26.1 PG (ref 26–34)
MCHC RBC AUTO-ENTMCNC: 29.4 G/DL (ref 31–37)
MCV RBC AUTO: 88.7 FL (ref 80–100)
MONOCYTES # BLD AUTO: 0.36 X10(3) UL (ref 0.1–1)
MONOCYTES NFR BLD AUTO: 9.3 %
NEUTROPHILS # BLD AUTO: 2.43 X10 (3) UL (ref 1.5–7.7)
NEUTROPHILS # BLD AUTO: 2.43 X10(3) UL (ref 1.5–7.7)
NEUTROPHILS NFR BLD AUTO: 62.6 %
OSMOLALITY SERPL CALC.SUM OF ELEC: 292 MOSM/KG (ref 275–295)
PLATELET # BLD AUTO: 368 10(3)UL (ref 150–450)
POTASSIUM SERPL-SCNC: 4.4 MMOL/L (ref 3.5–5.1)
PROT SERPL-MCNC: 6.7 G/DL (ref 5.7–8.2)
PROT UR-MCNC: 12.2 MG/DL (ref ?–14)
PROT/CREAT UR-RTO: 0.07
RBC # BLD AUTO: 3.99 X10(6)UL (ref 3.8–5.3)
SODIUM SERPL-SCNC: 142 MMOL/L (ref 136–145)
WBC # BLD AUTO: 3.9 X10(3) UL (ref 4–11)

## 2025-05-15 PROCEDURE — 84156 ASSAY OF PROTEIN URINE: CPT | Performed by: STUDENT IN AN ORGANIZED HEALTH CARE EDUCATION/TRAINING PROGRAM

## 2025-05-15 PROCEDURE — 85025 COMPLETE CBC W/AUTO DIFF WBC: CPT | Performed by: STUDENT IN AN ORGANIZED HEALTH CARE EDUCATION/TRAINING PROGRAM

## 2025-05-15 PROCEDURE — 82570 ASSAY OF URINE CREATININE: CPT | Performed by: STUDENT IN AN ORGANIZED HEALTH CARE EDUCATION/TRAINING PROGRAM

## 2025-05-15 PROCEDURE — 80053 COMPREHEN METABOLIC PANEL: CPT | Performed by: STUDENT IN AN ORGANIZED HEALTH CARE EDUCATION/TRAINING PROGRAM

## 2025-05-16 ENCOUNTER — TELEPHONE (OUTPATIENT)
Dept: OBGYN CLINIC | Facility: CLINIC | Age: 35
End: 2025-05-16

## 2025-05-16 DIAGNOSIS — R10.11 RUQ PAIN: Primary | ICD-10-CM

## 2025-05-16 DIAGNOSIS — R74.8 ELEVATED LIVER ENZYMES: ICD-10-CM

## 2025-05-16 NOTE — TELEPHONE ENCOUNTER
Called and spoke with pt. and see that pt. did review MCM that  had sent her and wanting to make sure pt. makes appointment. for US today and get in as soon as possible.     Pt.. said she checked on line and the soonest she can get in is for US  is the end of June, asked her to call CS and talk with representative and see if she could get in any sooner..told pt. to call office back and speak with nurses if she is unable to.  Also discussed with pt. to limit the use of tylenol for pain because of elevated  liver enzymes.  Pt. says she is taking motrin for pain and not tylenol.  Asked pt. How she was doing today and she said ok, her BP was 121/81, taking her Labetalol 400 mg BID and tolerating.no mention of RUQ pain today..  Pt. Verbalizes understanding of all information and agrees to plan.    My question is how long can pt. wait to have US done before you would make it a STAT US for pt.  Please advise.  Thanks

## 2025-05-16 NOTE — TELEPHONE ENCOUNTER
----- Message from Margret Jimenez sent at 5/16/2025  8:33 AM CDT -----  Regarding: RUQ US  Hi Ladies,I ordered a RUQ US for this patient in setting of postpartum preeclampsia with RUQ pain and now elevated LFTs. Can we please call her and make sure she is able to schedule US to rule out liver hematoma in setting of preeclampsia? And advise limit tylenol for pain.Thanks,Dr. Jimenez

## 2025-05-16 NOTE — TELEPHONE ENCOUNTER
Update...    Pt. got appointment for her US for tomorrow Sat. 5/17/25 @ 9am, she will also get her labs completed at that time...  Do you still want to keep her appointment. for Thurs. 5/29/25 @ 1045 am in [place] office or do you want to try to be scheduled sooner..  Please advise.  Thanks

## 2025-05-17 ENCOUNTER — HOSPITAL ENCOUNTER (OUTPATIENT)
Dept: ULTRASOUND IMAGING | Facility: HOSPITAL | Age: 35
Discharge: HOME | End: 2025-05-17
Attending: STUDENT IN AN ORGANIZED HEALTH CARE EDUCATION/TRAINING PROGRAM
Payer: COMMERCIAL

## 2025-05-17 DIAGNOSIS — R10.11 RUQ PAIN: ICD-10-CM

## 2025-05-17 DIAGNOSIS — R74.8 ELEVATED LIVER ENZYMES: ICD-10-CM

## 2025-05-17 PROCEDURE — 76705 ECHO EXAM OF ABDOMEN: CPT | Performed by: STUDENT IN AN ORGANIZED HEALTH CARE EDUCATION/TRAINING PROGRAM

## 2025-05-19 NOTE — TELEPHONE ENCOUNTER
Checked to see if pt. had her US over weekend and she did.  Dr. Jimenez did review results and send pt. a  MCM regarding results and recommendations.  Pt. Is feeling ok, but says that she believes the increase in her Labetalol last week maybe a little to high and causing her some dizziness.at times...  Pt. will cont. to monitor today and call and update office tomorrow if still having symptoms, then can pass on update to  who may decrease her dosing of Labetalol.  Pt. Verbalizes understanding of information and agrees to plan..  To call office with any symptoms or concerns .

## 2025-05-22 ENCOUNTER — PATIENT MESSAGE (OUTPATIENT)
Dept: OBGYN CLINIC | Facility: CLINIC | Age: 35
End: 2025-05-22

## 2025-05-28 ENCOUNTER — PATIENT MESSAGE (OUTPATIENT)
Dept: OBGYN CLINIC | Facility: CLINIC | Age: 35
End: 2025-05-28

## 2025-05-29 ENCOUNTER — TELEPHONE (OUTPATIENT)
Dept: OBGYN CLINIC | Facility: CLINIC | Age: 35
End: 2025-05-29

## 2025-05-29 ENCOUNTER — POSTPARTUM (OUTPATIENT)
Dept: OBGYN CLINIC | Facility: CLINIC | Age: 35
End: 2025-05-29
Payer: COMMERCIAL

## 2025-05-29 VITALS
HEIGHT: 64 IN | WEIGHT: 190 LBS | SYSTOLIC BLOOD PRESSURE: 110 MMHG | DIASTOLIC BLOOD PRESSURE: 80 MMHG | BODY MASS INDEX: 32.44 KG/M2 | HEART RATE: 54 BPM

## 2025-05-29 DIAGNOSIS — R74.01 TRANSAMINITIS: ICD-10-CM

## 2025-05-29 PROBLEM — O30.049 DICHORIONIC DIAMNIOTIC TWIN PREGNANCY, ANTEPARTUM (HCC): Status: RESOLVED | Noted: 2024-10-16 | Resolved: 2025-05-29

## 2025-05-29 PROBLEM — O99.210 OBESITY AFFECTING PREGNANCY, ANTEPARTUM (HCC): Status: RESOLVED | Noted: 2024-10-16 | Resolved: 2025-05-29

## 2025-05-29 PROBLEM — O26.899 RH NEGATIVE STATE IN ANTEPARTUM PERIOD (HCC): Status: RESOLVED | Noted: 2024-11-11 | Resolved: 2025-05-29

## 2025-05-29 PROBLEM — O09.819 PREGNANCY RESULTING FROM IN VITRO FERTILIZATION, ANTEPARTUM (HCC): Status: RESOLVED | Noted: 2024-10-16 | Resolved: 2025-05-29

## 2025-05-29 PROBLEM — O09.529 ANTEPARTUM MULTIGRAVIDA OF ADVANCED MATERNAL AGE (HCC): Status: RESOLVED | Noted: 2024-10-16 | Resolved: 2025-05-29

## 2025-05-29 PROBLEM — O21.9 NAUSEA AND VOMITING IN PREGNANCY (HCC): Status: RESOLVED | Noted: 2024-10-16 | Resolved: 2025-05-29

## 2025-05-29 PROBLEM — O14.03 MILD PREECLAMPSIA, THIRD TRIMESTER (HCC): Status: RESOLVED | Noted: 2025-04-16 | Resolved: 2025-05-29

## 2025-05-29 PROBLEM — Z34.90 PREGNANCY (HCC): Status: RESOLVED | Noted: 2025-04-16 | Resolved: 2025-05-29

## 2025-05-29 PROBLEM — O09.90 SUPERVISION OF HIGH RISK PREGNANCY, ANTEPARTUM (HCC): Status: RESOLVED | Noted: 2025-04-03 | Resolved: 2025-05-29

## 2025-05-29 PROBLEM — O99.810 ABNORMAL GLUCOSE TOLERANCE TEST IN PREGNANCY (HCC): Status: RESOLVED | Noted: 2025-01-23 | Resolved: 2025-05-29

## 2025-05-29 PROBLEM — Z33.3 SURROGATE PREGNANCY (HCC): Status: RESOLVED | Noted: 2024-10-16 | Resolved: 2025-05-29

## 2025-05-29 PROBLEM — Z67.91 RH NEGATIVE STATE IN ANTEPARTUM PERIOD (HCC): Status: RESOLVED | Noted: 2024-11-11 | Resolved: 2025-05-29

## 2025-05-29 PROCEDURE — 3008F BODY MASS INDEX DOCD: CPT | Performed by: STUDENT IN AN ORGANIZED HEALTH CARE EDUCATION/TRAINING PROGRAM

## 2025-05-29 PROCEDURE — 3074F SYST BP LT 130 MM HG: CPT | Performed by: STUDENT IN AN ORGANIZED HEALTH CARE EDUCATION/TRAINING PROGRAM

## 2025-05-29 PROCEDURE — 3079F DIAST BP 80-89 MM HG: CPT | Performed by: STUDENT IN AN ORGANIZED HEALTH CARE EDUCATION/TRAINING PROGRAM

## 2025-05-29 NOTE — PATIENT INSTRUCTIONS
Exercising After Pregnancy  Exercise is great for new moms. It can make your body stronger and give you more energy. It can improve your mood and your sleep. And it can help get rid of stress. Read on for tips on how to exercise safely after having your baby.     When can you start?   Ask your healthcare provider how soon you can start exercising after the delivery. If you had a vaginal birth with no complications, you can likely start a few days later. Or whenever you feel ready.  If you had a complicated delivery or a  section, you may have some restrictions. Talk with your healthcare provider. Your body will need to recover and heal. You may be able to do some light walking or stretching in the first few weeks. But it will likely be about 4 to 6 weeks before you can start regular exercise again.    Getting started  When you’re ready to start exercising, follow these tips:   Start out slowly. Your body has just been through its biggest workout ever. So start with a low-impact activity. And don’t overdo it.  Aim to be active for 20 to 30 minutes on most days of the week.  It may be easier to break this up into shorter chunks of time. For example, try 10 minutes of activity 2 or 3 times a day.  When you’re first working out after baby, try these activities:   Take a walk. Walking is a great, low-impact way to ease back into exercise. You can even take your baby with you in a stroller. Want some company? Ask a friend to walk with you. Or see if there is a local walking group for new moms.  Join a class. You might find it fun to work out with a group. Check out your local community center or gym. They may offer swimming. Or classes just for new moms.  Once you are actively working out again, experts advise that healthy adults get at least 150 minutes of moderate-intensity aerobic activity each week. Try to exercise 20 to 30 minutes on most days of the week.   Exercises that help make muscles strong target  certain muscle groups. These include the arms, legs, and hips. Get your healthcare provider’s OK first. Then add these kinds of activities at least 2 days a week. Examples include:   Kegel pelvic floor muscle exercises (These help reduce urinary and bowel incontinence after birth.)  Yoga  Pilates  Breastfeeding and exercising  Doing low- or moderate-intensity exercise won’t affect your breastmilk. You will still make the same amount of milk.   In some cases, high-intensity workouts may raise the lactic acid levels in breastmilk. This can make the milk taste sour. Some babies may not like the taste.   It’s best to nurse your baby or pump your milk before you exercise. This prevents you from having painful and very full (engorged) breasts when you work out. And it can prevent any possible problems with lactic acid.   Helpful tips  Start slowly. Don’t overdo it.  Try walking each day.  Drink plenty of water.  Wear a bra with good support.  Wear nursing pads if you’re breastfeeding. These are helpful if you start to leak breastmilk.  Stop right away if you have any pain when exercising.    Workout intensity  Moderate-intensity aerobic exercise. This type of activity makes your heart beat faster. But you can still talk while doing it. Examples include:   Brisk walking  Water aerobics  Biking slower than 10 miles an hour on a flat surface    High-intensity aerobic exercise. This type of activity pushes your body more. You’ll start to sweat. And it’s hard to talk without getting out of breath. Examples include:   Running  Swimming laps  Biking 10 miles an hour or faster  [employer] last reviewed this educational content on 3/1/2023  This information is for informational purposes only. This is not intended to be a substitute for professional medical advice, diagnosis, or treatment. Always seek the advice and follow the directions from your physician or other qualified health care provider.  © 9335-9275 [employer]  LLC. All rights reserved. This information is not intended as a substitute for professional medical care. Always follow your healthcare professional's instructions.

## 2025-05-29 NOTE — TELEPHONE ENCOUNTER
Pt called to speak with nurse re letter for maternity leave stating it would a 6 week recovery time. Pls call pt via cell phone.

## 2025-05-29 NOTE — PROGRESS NOTES
[facility]  Obstetrics and Gynecology   Postpartum Progress Note    Subjective:     Krista Collado is a 35 year old  female who is s/p  on 2025. Her pregnancy was complicated by di-di twin gestation, surrogacy pregnancy, AMA, preeclampsia with severe features, and postpartum hemorrhage. She reports doing well. Self discontinued PO Labetalol due to symptomatic low BP, and BP since have been normotensive. Pt denies PIH symptoms. Stopped pumping 1 week ago and breast milk has dried up. Denies engorgement or pain. The patient reports vagina bleeding has stopped, has not resumed menses. Has resumed sexual intercourse without difficulty. The patient denies emotional concerns.     Review of Systems:  General:  denies fevers, chills, fatigue and malaise.   Respiratory:  denies SOB, dyspnea, cough or wheezing  Cardiovascular:  denies chest pain, palpitations, exercise intolerance   GI: denies abdominal pain, diarrhea, constipation  :  denies dysuria, hematuria, increased urinary frequency.  denies abnormal uterine bleeding or vaginal discharge.       Objective:     Vitals:    25 1045   BP: 110/80   Pulse: 54   Weight: 190 lb (86.2 kg)   Height: 64\"         Body mass index is 32.61 kg/m².    General: AAO.NAD.   CVS exam: normal peripheral perfusion  Chest: non-labored breathing, no tachypnea   Abdominal exam: soft, nontender, nondistended  Pelvic exam:   VULVA: normal appearing vulva with no masses, tenderness or lesions  PERINEUM: intact, well healed, no signs of infection.   VAGINA: normal appearing vagina with normal color and discharge, no lesions  CERVIX: normal appearing cervix without discharge or lesions  UTERUS: uterus is normal size, shape, consistency and nontender  ADNEXA: normal adnexa in size, nontender and no masses  Ext: non-tender, no edema    Labs:         Assessment:     Krista Collado is a 35 year old  female who presents for postpartum visit   Problem  List[1]      Plan:     Postpartum exam   - s/p    - doing well, no complaints   - no abnormal findings on physical exam   - may return to normal activity   Contraception counseling   - discussion held with patient about family planning and contraception  - pt reports  has vasectomy  -Depression Scale Total: 0 (2025 10:46 AM)     Postpartum Severe Preeclampsia  Transaminitis  - s/p IV magnesium and PO lasix x5 days  - pt was discharged from hospital on PO nifedipine, then discontinued and switched to PO labetalol  - she self-discontinued due to symptomatic low BP; denies PIH symptoms  - recent PIH labs (5/15) were overall normal except for elevated LFTs  - RUQ US was wnl, non-obstructing gallstones  - repeat CMP ordered to be completed in 1 month  - if still elevated, will sent referral to gastroenterology    All of the findings and plan were discussed with the patient.  She notes understanding and agrees with the plan of care.  All questions were answered to the best of my ability at this time.    RTC in 1 year for well woman exam or sooner if needed     Margret Jimenez,    [facility] - [facility]BG[facility]       Note to patient and family   The  [employer] Cures Act makes medical notes available to patients in the interest of transparency.  However, please be advised that this is a medical document.  It is intended as lyys-xi-egrh communication.  It is written and medical language may contain abbreviations or verbiage that are technical and unfamiliar.  It may appear blunt or direct.  Medical documents are intended to carry relevant information, facts as evident, and the clinical opinion of the practitioner.          [1]   Patient Active Problem List  Diagnosis    Surrogate pregnancy (HCC)    Pregnancy resulting from in vitro fertilization, antepartum (HCC)    Antepartum multigravida of advanced maternal age (HCC)    Obesity affecting pregnancy, antepartum (HCC)    Dichorionic diamniotic twin pregnancy, antepartum  (HCC)    Nausea and vomiting in pregnancy ([facility])    Rh negative state in antepartum period ([facility])    Major depressive disorder, single episode, severe without psychotic features ([facility])    Abnormal glucose tolerance test in pregnancy ([facility])    Supervision of high risk pregnancy, antepartum ([facility])    Mild preeclampsia, third trimester (HCC)    Pregnancy (HCC)    Postpartum hemorrhage (HCC)     (spontaneous vaginal delivery) ([facility])     delivery (HCC)    Preeclampsia in postpartum period ([facility])    Postpartum anemia ([facility])

## 2025-05-30 NOTE — TELEPHONE ENCOUNTER
I spoke with patient, she needs letter for surrogacy company showing delivery date & 6 week recovery time. Letter sent to patient in [employer].     Patient requesting second note for her rtw date on 5/31/25 as per 5/29 office visit note. Note sent to patient in [employer].

## 2025-07-10 ENCOUNTER — PATIENT MESSAGE (OUTPATIENT)
Dept: OBGYN CLINIC | Facility: CLINIC | Age: 35
End: 2025-07-10

## 2025-07-11 ENCOUNTER — TELEPHONE (OUTPATIENT)
Dept: OBGYN CLINIC | Facility: CLINIC | Age: 35
End: 2025-07-11

## 2025-07-11 NOTE — TELEPHONE ENCOUNTER
Patient is looking to do another surrogacy pregnancy & is asking if she will need another office visit or can form just be completed? LOV 5/29/25 postpartum visit.

## 2025-07-11 NOTE — TELEPHONE ENCOUNTER
Patient has sent on [employer] an attachment and would like to let us know, so the doctor can approve and sign. Please advise

## 2025-07-17 NOTE — TELEPHONE ENCOUNTER
Pt calling back to see if the nurse was able to view her [employer] message. Please advise.    fingers/toes warm to touch/no paresthesia/no swelling/no cyanosis of extremity/capillary refill time < 2 seconds

## 2025-07-22 ENCOUNTER — TELEPHONE (OUTPATIENT)
Dept: OBGYN CLINIC | Facility: CLINIC | Age: 35
End: 2025-07-22

## 2025-07-25 ENCOUNTER — MED REC SCAN ONLY (OUTPATIENT)
Dept: OBGYN CLINIC | Facility: CLINIC | Age: 35
End: 2025-07-25

## 2025-07-29 RX ORDER — OMEPRAZOLE 20 MG/1
20 CAPSULE, DELAYED RELEASE ORAL
Qty: 90 CAPSULE | Refills: 0 | OUTPATIENT
Start: 2025-07-29

## 2025-08-14 ENCOUNTER — TELEPHONE (OUTPATIENT)
Dept: OBGYN CLINIC | Facility: CLINIC | Age: 35
End: 2025-08-14

## (undated) NOTE — LETTER
25    Re: Krista Collado  : 1990      To Whom It May Concern:    Krista Collado is under my care and has requested to start her leave of absence starting on 25. This is a reasonable request due to her high risk pregnancy. She has concerns for the health of her pregnancy working her full time job status & is having difficulties completing her job duties. Her estimated due date is 25.    If you have any questions concerning this letter, please feel free to contact my office.        Sincerely yours,    Ana Grubbs MD

## (undated) NOTE — LETTER
2024      Margret Jimenez, DO  [address]  [facility] [address]  Via In Basket  Patient: Krista Collado  : 1990    Dear Colleague:  Thank you for referring your patient to me for a Maternal Fetal Medicine evaluation. Please see my attached note for my findings and recommendations.      Should you have any questions or concerns, please do not hesitate to contact me at the number listed below.    Best Regards,      Jodi Ibarra MD  [facility]  [address] [facility] [address]  [place] [id number]  [phone]    cc: No Recipients      [facility] Department of Maternal Fetal Medicine  Patient Name: Krista Collado  Patient : 1990  Physician: Jodi Ibarra MD    Outpatient Maternal-Fetal Medicine Consultation    Dear Dr. Jimenez    Thank you for requesting ultrasound evaluation and maternal fetal medicine consultation on your patient Krista Collado.  As you are aware she is a 34 year old female  with a twin pregnancy and an Estimated Date of Delivery: 25 A maternal-fetal medicine  f/u is today. Her prenatal records and labs were reviewed.    HISTORY  # 1 - Date: 14, Sex: Female, Weight: 8 lb (3.629 kg), GA: 40w0d, Type: Vaginal, Spontaneous, Apgar1: None, Apgar5: None, Living: Living, Birth Comments: None    # 2 - Date: 16, Sex: Female, Weight: 8 lb 1 oz (3.657 kg), GA: 40w0d, Type: Vaginal, Spontaneous, Apgar1: None, Apgar5: None, Living: Living, Birth Comments: None    # 3 - Date: None, Sex: None, Weight: None, GA: None, Type: None, Apgar1: None, Apgar5: None, Living: None, Birth Comments: None      Past Medical History  The patient  has no past medical history on file.    Past Surgical History  The patient  has no past surgical history on file.    Family History  The patient has no family status information on file.       Medications:   Current Outpatient Medications:     prenatal vitamin with DHA 27-0.8-228 MG Oral Cap, Take 1  capsule by mouth daily., Disp: , Rfl:     aspirin 81 MG Oral Tab EC, Take 1 tablet (81 mg total) by mouth daily., Disp: , Rfl:   Allergies: Allergies[1]    PHYSICAL EXAMINATION:  /82 (BP Location: Right arm, Patient Position: Sitting, Cuff Size: adult)   Pulse 88   Ht 5' 4\" (1.626 m)   Wt 189 lb (85.7 kg)   LMP 07/04/2024 (Approximate)   BMI 32.44 kg/m²   General: alert and oriented,no acute distress  Abdomen: gravid, soft, non-tender      OBSTETRIC ULTRASOUND  Twin A - IUP in breech presentation.    Placenta is anterior.   Cardiac activity is present, 137 bpm  MVP is 3.2 cm.     Twin B - IUP in breech right presentation.   Placenta is anterior.  Cardiac activity is present, 147 bpm  MVP is 3.2 cm.     The cervix was not able to be clearly visualized and appeared short by transabdominal ultrasound, therefore transvaginal ultrasound was performed. Transvaginal US findings: Cervix is closed, long and no funneling seen measuring 34.4 mm   See PACS/Imaging Tab For Complete Ultrasound Report  I interpreted the results and reviewed them with the patient.    DISCUSSION  During her visit we discussed and reviewed the following issues:  ADVANCED MATERNAL AGE    Background  I reviewed with the patient that pregnancies in women of advanced maternal age (35 or older at delivery) are associated with elevated risks.  Specifically, there is a higher rate of:    Fetal malformations  Preeclampsia  Gestational diabetes  Intrauterine fetal death   Please see previous [facility] detailed discussion.     The patient ultimately elected to obtain NIPT (cell-free fetal DNA assessment).  We expect the results to be available within 7 days; the patient will be informed when the results are available.    DIAMNIOTIC DICHORIONIC TWIN GESTATION     Please see previous [facility] detailed discussion.     IVF GESTATION  Conception by IVF Please see previous [facility] detailed discussion.     IMPRESSION:  IUP at 17w1d   Dichorionic Diamniotic Twin  Gestation  AMA: low-risk cell free fetal DNA, declined invasive testing male female pair -however oocyte from 23-year-old and patient reports low risk PGT  IVF Gestation  Gestational Carrier  Short cervix not found.    RECOMMENDATIONS:  Continue care with Dr. Jimenez  Level II U/S at 20 weeks  Fetal echocardiogram at 24 weeks  Monthly growth ultrasounds in the third trimester  Weekly NST's at 32 weeks.  Monitor for signs or symptoms of  labor, preeclampsia or fetal growth disturbances.  In the absence of other maternal or fetal indications, delivery advised at 38 weeks gestation.    Thank you for allowing me to participate in the care of your patient.  Please do not hesitate to contact me if additional questions or concerns arise.      Jodi Ibarra MD     20 minutes spent in review of records, patient consultation, documentation and coordination of care.  The relevant clinical matter(s) are summarized above.     Note to patient and family  The 21st Century Cures Act makes medical notes available to patients in the interest of transparency.  However, please be advised that this is a medical document.  It is intended as [id number] communication.  It is written and medical language may contain abbreviations or verbiage that are technical and unfamiliar.  It may appear blunt or direct.  Medical documents are intended to carry relevant information, facts as evident, and the clinical opinion of the practitioner.         [1] No Known Allergies      Pt here for Cervical Length/Di Di Twin  Flutters x 1 occurrence felt per patient  Pt denies complaints.

## (undated) NOTE — LETTER
25    Re: Krista Collado  : 1990    To Whom It May Concern:    Krista Collado is under my care & may return to work without restrictions on 2025.  If you have any questions concerning this letter, please feel free to contact my office.        Sincerely yours,    Margret Jimenez, DO